# Patient Record
Sex: FEMALE | Race: WHITE | NOT HISPANIC OR LATINO | Employment: FULL TIME | URBAN - METROPOLITAN AREA
[De-identification: names, ages, dates, MRNs, and addresses within clinical notes are randomized per-mention and may not be internally consistent; named-entity substitution may affect disease eponyms.]

---

## 2021-05-21 ENCOUNTER — EXTERNAL ORDER RESULTS (OUTPATIENT)
Dept: TRANSPLANT | Facility: CLINIC | Age: 51
End: 2021-05-21

## 2021-05-21 ENCOUNTER — TRANSFERRED RECORDS (OUTPATIENT)
Dept: HEALTH INFORMATION MANAGEMENT | Facility: CLINIC | Age: 51
End: 2021-05-21

## 2021-05-26 ENCOUNTER — TRANSFERRED RECORDS (OUTPATIENT)
Dept: HEALTH INFORMATION MANAGEMENT | Facility: CLINIC | Age: 51
End: 2021-05-26

## 2021-06-02 ENCOUNTER — MEDICAL CORRESPONDENCE (OUTPATIENT)
Dept: HEALTH INFORMATION MANAGEMENT | Facility: CLINIC | Age: 51
End: 2021-06-02

## 2021-06-08 ENCOUNTER — TELEPHONE (OUTPATIENT)
Dept: ORTHOPEDICS | Facility: CLINIC | Age: 51
End: 2021-06-08

## 2021-06-08 NOTE — TELEPHONE ENCOUNTER
[facility] Call [facility]    Phone Message    May a detailed message be left on voicemail: yes     Reason for Call: Other: Patient is referred to Dr. Wilkinson, Dr. Hutchins or Dr. Rae for the Left Upper Arm Mass. Please contact Aliyah to schedule. Aliyah will also be sending records and any/all images will be pushed over.     Action Taken: Message routed to:  [facility] Orthopedics    Travel Screening: Not Applicable

## 2021-06-08 NOTE — TELEPHONE ENCOUNTER
Called Aliyah at [facility]. Offered first available apt with Dr. Rae on 6/15. They are ok with the date. They will call with questions or concerns.             -YISEL Webster- Orthopedics

## 2021-06-09 ENCOUNTER — TRANSCRIBE ORDERS (OUTPATIENT)
Dept: OTHER | Age: 51
End: 2021-06-09

## 2021-06-09 DIAGNOSIS — R22.32 ARM MASS, LEFT: Primary | ICD-10-CM

## 2021-06-09 NOTE — TELEPHONE ENCOUNTER
[facility]    Phone Message    May a detailed message be left on voicemail: yes     Reason for Call:  Aliyah is calling to relay that the request for Pathology slides was sent to [facility] in [place] since that is were they were taken -- the slides should be on there way but if we do need to reach out , the Case # to reference is [id number]     Action Taken: Message routed to:  [facility] ortho    Travel Screening: Not Applicable     No c/b needed - just passing info along

## 2021-06-11 PROCEDURE — 999N001032 HC STATISTIC REVIEW OUTSIDE SLIDES TC 88321: Performed by: ORTHOPAEDIC SURGERY

## 2021-06-11 PROCEDURE — 88321 CONSLTJ&REPRT SLD PREP ELSWR: CPT | Performed by: PATHOLOGY

## 2021-06-15 ENCOUNTER — VIRTUAL VISIT (OUTPATIENT)
Dept: ORTHOPEDICS | Facility: CLINIC | Age: 51
End: 2021-06-15
Payer: COMMERCIAL

## 2021-06-15 DIAGNOSIS — C49.9 SARCOMA OF SOFT TISSUE (H): Primary | ICD-10-CM

## 2021-06-15 DIAGNOSIS — C49.12 MALIGNANT NEOPLASM OF CONNECTIVE AND SOFT TISSUE OF LEFT UPPER LIMB, INCLUDING SHOULDER (H): ICD-10-CM

## 2021-06-15 PROBLEM — J45.909 REACTIVE AIRWAY DISEASE: Status: ACTIVE | Noted: 2021-06-15

## 2021-06-15 PROBLEM — N39.3 FEMALE STRESS INCONTINENCE: Status: ACTIVE | Noted: 2021-06-15

## 2021-06-15 PROBLEM — L68.0 HIRSUTISM: Status: ACTIVE | Noted: 2021-06-15

## 2021-06-15 PROBLEM — G47.33 OSA (OBSTRUCTIVE SLEEP APNEA): Status: ACTIVE | Noted: 2021-06-15

## 2021-06-15 PROBLEM — Z87.891 FORMER SMOKER: Status: ACTIVE | Noted: 2021-06-15

## 2021-06-15 PROBLEM — Z90.81 S/P SPLENECTOMY: Status: ACTIVE | Noted: 2021-06-15

## 2021-06-15 PROBLEM — G43.909 MIGRAINE: Status: ACTIVE | Noted: 2021-06-15

## 2021-06-15 PROBLEM — E11.9 DIABETES MELLITUS, TYPE II (H): Status: ACTIVE | Noted: 2021-06-15

## 2021-06-15 PROBLEM — G47.10 HYPERSOMNIA: Status: ACTIVE | Noted: 2021-06-15

## 2021-06-15 PROBLEM — E55.9 VITAMIN D DEFICIENCY: Status: ACTIVE | Noted: 2021-06-15

## 2021-06-15 PROBLEM — F90.9 ADHD: Status: ACTIVE | Noted: 2021-06-15

## 2021-06-15 PROCEDURE — 99202 OFFICE O/P NEW SF 15 MIN: CPT | Mod: 95 | Performed by: ORTHOPAEDIC SURGERY

## 2021-06-15 RX ORDER — RIZATRIPTAN BENZOATE 5 MG/1
TABLET ORAL
COMMUNITY
Start: 2021-05-21

## 2021-06-15 RX ORDER — CETIRIZINE HYDROCHLORIDE 10 MG/1
10 TABLET ORAL
COMMUNITY
Start: 2021-05-21

## 2021-06-15 RX ORDER — AMITRIPTYLINE HYDROCHLORIDE 50 MG/1
50 TABLET ORAL
COMMUNITY
Start: 2021-05-21

## 2021-06-15 RX ORDER — TOPIRAMATE 25 MG/1
25 TABLET, FILM COATED ORAL
COMMUNITY
Start: 2021-05-21

## 2021-06-15 NOTE — LETTER
6/15/2021         RE: Imani Albert  [address]  [facility] [address]        Dear Colleague,    Thank you for referring your patient, Imani Albert, to the [facility] [place]. Please see a copy of my visit note below.    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  We will arrange for her staging studies as well as her radiation oncology appointment and treatment.  I suspect based on what I know thus far she will require an additional surgical excision after she has been treated with her preoperative radiation.  Thank you for involving me in her care.  Please contact me if you have any questions.    Sincerely    Patient is a 50-year-old female who reports several years of the small stable mass in the posterior aspect of her left arm.  She reports the mass has enlarged over the past several months.  Seeing her local treatment team they recommended surgical excision as as the mass was bothering her.    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    To medicate her past medical history is reported to be none in her E HR.  However she is on a variety of medications which are also outlined and seem to be focused on treating migraine headaches.    She has had no previous imaging to review.    I discussed with her the nature of the sarcoma diagnosis and the staging that we'll need to occur.  I also described to her the need for radiation treatment and most likely additional surgery.  All her questions were answered.  She would like to move forward with evaluation and feels very strongly that she would like to perform through [facility] but in [place].    Impression: New diagnosis small subcutaneous high-grade pleomorphic liposarcoma.    Plan:  1. Chuyita to contact the patient to arrange a PET/CT and of the entire body and MRI scan of the left arm with a tumor bed marker.  Preferably arrange these at [place] if possible.  2.  Chuyita to arrange for the patient to be seen by one of the [place] sarcoma radiation physicians at [place].  3.  Chuyita to arrange for pathologist to review and confirm the diagnosis.        Abdirahman Rae MD

## 2021-06-15 NOTE — LETTER
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  We will arrange for her staging studies as well as her radiation oncology appointment and treatment.  I suspect based on what I know thus far she will require an additional surgical excision after she has been treated with her preoperative radiation.  Thank you for involving me in her care.  Please contact me if you have any questions.    Sincerely    Patient is a 50-year-old female who reports several years of the small stable mass in the posterior aspect of her left arm.  She reports the mass has enlarged over the past several months.  Seeing her local treatment team they recommended surgical excision as as the mass was bothering her.    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    To medicate her past medical history is reported to be none in her E HR.  However she is on a variety of medications which are also outlined and seem to be focused on treating migraine headaches.    She has had no previous imaging to review.    I discussed with her the nature of the sarcoma diagnosis and the staging that we'll need to occur.  I also described to her the need for radiation treatment and most likely additional surgery.  All her questions were answered.  She would like to move forward with evaluation and feels very strongly that she would like to perform through [facility] but in [place].    Impression: New diagnosis small subcutaneous high-grade pleomorphic liposarcoma.    Plan: 1. Chuyita to contact the patient to arrange a PET/CT and of the entire body and MRI scan of the left arm with a tumor bed marker.  Preferably arrange these at [place] if  possible.  2.  Chuyita to arrange for the patient to be seen by one of the [place] sarcoma radiation physicians at [place].  3.  Chuyita to arrange for pathologist to review and confirm the diagnosis.    Abdirahman Rae MD

## 2021-06-15 NOTE — PROGRESS NOTES
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  We will arrange for her staging studies as well as her radiation oncology appointment and treatment.  I suspect based on what I know thus far she will require an additional surgical excision after she has been treated with her preoperative radiation.  Thank you for involving me in her care.  Please contact me if you have any questions.    Sincerely    Patient is a 50-year-old female who reports several years of the small stable mass in the posterior aspect of her left arm.  She reports the mass has enlarged over the past several months.  Seeing her local treatment team they recommended surgical excision as as the mass was bothering her.    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    To medicate her past medical history is reported to be none in her E HR.  However she is on a variety of medications which are also outlined and seem to be focused on treating migraine headaches.    She has had no previous imaging to review.    I discussed with her the nature of the sarcoma diagnosis and the staging that we'll need to occur.  I also described to her the need for radiation treatment and most likely additional surgery.  All her questions were answered.  She would like to move forward with evaluation and feels very strongly that she would like to perform through [facility] but in [place].    Impression: New diagnosis small subcutaneous high-grade pleomorphic liposarcoma.    Plan: 1. Chuyita to contact the patient to arrange a PET/CT and of the entire body and MRI scan of the left arm with a tumor bed marker.  Preferably arrange these at [place] if possible.  2.  Chuyita to arrange for the patient to be seen by one of the [place]  sarcoma radiation physicians at [place].  3.  Chuyita to arrange for pathologist to review and confirm the diagnosis.

## 2021-06-15 NOTE — NURSING NOTE
Chief Complaint   Patient presents with     Consult     discuss left upper arm mass referred by Dr. Dutch Turcios at [facility] // she had a removal and was informed that it's cancerous per pt        50 year old  1970        Date/Surgery/Surgeon/Hospital:  1. 5/26/21 Left upper arm mass removal by Dr. Raman Turcios at [facility]                      Pain Assessment  Patient Currently in Pain: No(no pain per pt)                 [facility] [address] [place] [address] [address] [place] [address]        Allergies   Allergen Reactions     Justicia Adhatoda ([place] Nut Tree)  [Justicia Adhatoda]      Other reaction(s): Throat Swelling/Closing  All nuts that are packaged with shells     Rofecoxib      VIOXX              Other-Dizziness           Current Outpatient Medications   Medication     amitriptyline (ELAVIL) 50 MG tablet     cetirizine (ZYRTEC) 10 MG tablet     rizatriptan (MAXALT) 5 MG tablet     topiramate (TOPAMAX) 25 MG tablet     No current facility-administered medications for this visit.

## 2021-06-18 ENCOUNTER — TELEPHONE (OUTPATIENT)
Dept: ORTHOPEDICS | Facility: CLINIC | Age: 51
End: 2021-06-18

## 2021-06-18 NOTE — TELEPHONE ENCOUNTER
RN unable to schedule PET scan at [place] for PET scan as machine is either down or constructions.   not sure of reason.  Patient will do at the .

## 2021-06-18 NOTE — TELEPHONE ENCOUNTER
RN called and spoke with PET scan pre-clearance.  She will need prior authorization, may take 7-10 days,.  Leticia will submit as urgent.

## 2021-06-18 NOTE — TELEPHONE ENCOUNTER
RN called and spoke with Imani.  She is scheduled for her MRI of the Left arm with marker at [place] for  06-23-21 at 1130.  [facility]  [id number] [place] [address] Avenue       She is also scheduled for a PET scan.  Her insurance requires a prior authorization that may take as long as 7-10 days.  RN has scheduled for 06-28-21 at the [address] [address]    She was given restrictions and instructions.  She will attend both of these appoitments.  She will await for call from Radiation for consult.    RN has also requested slides from Guevara Mccullough for review here at the .

## 2021-06-21 LAB — COPATH REPORT: NORMAL

## 2021-06-23 ENCOUNTER — ANCILLARY PROCEDURE (OUTPATIENT)
Dept: MRI IMAGING | Facility: CLINIC | Age: 51
End: 2021-06-23
Attending: ORTHOPAEDIC SURGERY
Payer: COMMERCIAL

## 2021-06-23 DIAGNOSIS — C49.9 SARCOMA OF SOFT TISSUE (H): ICD-10-CM

## 2021-06-23 PROCEDURE — 73220 MRI UPPR EXTREMITY W/O&W/DYE: CPT | Mod: LT | Performed by: RADIOLOGY

## 2021-06-23 PROCEDURE — A9585 GADOBUTROL INJECTION: HCPCS | Performed by: RADIOLOGY

## 2021-06-23 RX ORDER — GADOBUTROL 604.72 MG/ML
10 INJECTION INTRAVENOUS ONCE
Status: COMPLETED | OUTPATIENT
Start: 2021-06-23 | End: 2021-06-23

## 2021-06-23 RX ADMIN — GADOBUTROL 10 ML: 604.72 INJECTION INTRAVENOUS at 12:16

## 2021-06-23 NOTE — PROGRESS NOTES
Radiation Oncology Consultation:  Date on this visit: 7/1/2021    Imani Albert  is referred by Dr.Denis See Rae for a radiation oncology consultation. She requires evaluation for diagnosis of subcutaneouds  high-grade pleomorphic liposarcoma      History Of Present Illness:  Ms. Alebrt is a 50 year old female who presents with a diagnosis of  high grade pleomorphic liposarcoma s/p incomplete excision on 5/26/21 by general surgeon Dr. Raman Turcios at [facility].    She reports several years of the small stable mass in the posterior aspect of her left arm which had enlarged over the past several months. There was no pain associated with this mass. Her local treatment physician recommended surgical excision as as the mass was bothering her. This was performed as an outpatient on 5/26/21 under local anesthesia.  An elliptical 6.3 x 2.5 cm skin incision was made after local anesthetic was injected, which was the posterior middle of the left upper arm.    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    She was referred to Dr Rae who recommended an MRI and PET for staging then preoperative RT followed by re-excision of the tumor bed (if no metastasis found).    She underwent the MRI 6/23 revealing subcutaneous edema and enhancement, presumably  related to combination of postsurgical change and residual tumors. This extends approximately 5 cm in craniocaudal dimension. This did not extend to investing layer of deep fascia. Additionally a small (6 x 6 mm) hyperintense enhancing focus within the proximal humeral shaft, nonspecific was seen.     PET/CT on 6/28/21 demonstrated multiple scattered pulmonary nodules, the largest measuring 5 mm, but no evidence of metastatic disease. The left arm  demonstrated partly imaged low-grade FDG uptake along the presumed surgical site.     She has thus been referred for pre-operative radiotherapy prior to re-excision by Dr. Rae. She has done well since surgery. Her range of motion is good. She does not have any pain. She has been actively trying to lose weight; she takes supplements Barrett Vy and walks 5 miles daily.       Past Medical/Surgical History:  Past Medical History:   Diagnosis Date     Depression      Migraines      Sarcoma (H) 05/26/2021    high grade pleomorphic sarcoma     Past Surgical History:   Procedure Laterality Date     SURGICAL PATHOLOGY EXAM Left 05/26/2021    Skin and Soft Tissue Excision of Left Upper Arm - [facility]       Past Radiation History: none  Past Chemotherapy History: none   Implanted Cardiac device:   none  History of Connective Tissue Disorders: no    Review of Systems:  Reviewed.  See details in nursing note    Allergies:  Allergies as of 07/01/2021 - Reviewed 06/15/2021   Allergen Reaction Noted     Markicia adhatoda ([place] nut tree)  [justicia adhatoda]  11/17/2009     Rofecoxib  06/15/2021       Current Medications:     Current Outpatient Medications:      amitriptyline (ELAVIL) 50 MG tablet, Take 50 mg by mouth, Disp: , Rfl:      cetirizine (ZYRTEC) 10 MG tablet, Take 10 mg by mouth, Disp: , Rfl:      rizatriptan (MAXALT) 5 MG tablet, TAKE ONE TABLET BY MOUTH EVERY 2 HOURS AS NEEDED FOR MIGRAINE. TAKE AT LEAST 2 HOURS APART. MAX 6 TABLETS PER DAY, Disp: , Rfl:      topiramate (TOPAMAX) 25 MG tablet, Take 25 mg by mouth, Disp: , Rfl:   No current facility-administered medications for this visit.     Family History:  No family history on file.    Social History:  Resides in [place] with significant other  Currently not working, off-season with [employer], works from home otherwise  Remote history of smoking  No alcohol use    Physical Exam:  There were no vitals taken for this visit.    GENERAL APPEARANCE: healthy, alert and  "no distress  HENT: Normocephalic, atraumatic   NECK: no adenopathy, no asymmetry or masses   LYMPHATICS: No cervical, supraclavicular, axillary or inguinal lymphadenopathy   RESP: breathing comfortably on room air, no audible wheezes  CARDIOVASCULAR: no cyanosis or pedal edema   ABDOMEN:  soft, nontender   MUSCULOSKELETAL: extremities normal- no gross deformities noted, no evidence of inflammation in joints, FROM in all extremities. No edema b/l LE.   Scar: well healing, along left upper arm.  SKIN: no suspicious lesions or rashes   PSYCHIATRIC: mentation appears normal and affect normal      Pathology: Reviewed    Laboratory/Imaging Studies:    PET and MRI were reviewed    ASSESSMENT:    T1a N0 M0  ( subcutaneous )   high grade liposarcoma of the left upper extremity.  s/p  excision with positive margins.      RECOMMENDATION:    I agree that a course of \"preoperative\"  radiotherapy followed by tumor bed re-exicsion would be the best course of action now. The dose is 50 Gy over 5 weeks, likely utilizing electrons given its relatively shallow location.     The alternative of defininitive postoperative radiotherapy is less desirable since the total dose would need to be on the order 66 Gy and the skin reation would be a challenge.      The risks and benefits of radiotherapy were discussed with the patient.  Potential acute and long term side effects were explained. The patient agrees to proceed with radiation therapy.  A written consent was obtained. We plan on starting radiotherapy on July 12.     Karla Diane MD PGY3  Department of Radiation Oncology  [phone]    Thank you for allowing me to participate in Imani Albert 's care .  Please do not hesitate to call me with questions.    Maite Long MD  [phone]   Pager   [phone]  [facility]   [phone] [place]  [phone] [facility]  Primary Physician: Raman Turcios   Patient Care Team:  Raman Turcios as PCP - " Chuyita Segal, RN as Specialty Care Coordinator (Orthopedics)  Raman Turcios as Referring Physician  SALOMÓN BECK

## 2021-06-28 ENCOUNTER — HOSPITAL ENCOUNTER (OUTPATIENT)
Dept: PET IMAGING | Facility: CLINIC | Age: 51
Discharge: HOME | End: 2021-06-28
Attending: ORTHOPAEDIC SURGERY | Admitting: ORTHOPAEDIC SURGERY
Payer: COMMERCIAL

## 2021-06-28 DIAGNOSIS — C49.9 SARCOMA OF SOFT TISSUE (H): ICD-10-CM

## 2021-06-28 DIAGNOSIS — C49.12 MALIGNANT NEOPLASM OF CONNECTIVE AND SOFT TISSUE OF LEFT UPPER LIMB, INCLUDING SHOULDER (H): ICD-10-CM

## 2021-06-28 PROCEDURE — 74177 CT ABD & PELVIS W/CONTRAST: CPT | Mod: 26

## 2021-06-28 PROCEDURE — 71260 CT THORAX DX C+: CPT

## 2021-06-28 PROCEDURE — 343N000001 HC RX 343: Performed by: ORTHOPAEDIC SURGERY

## 2021-06-28 PROCEDURE — 78816 PET IMAGE W/CT FULL BODY: CPT | Mod: PI

## 2021-06-28 PROCEDURE — 250N000011 HC RX IP 250 OP 636: Performed by: ORTHOPAEDIC SURGERY

## 2021-06-28 PROCEDURE — A9552 F18 FDG: HCPCS | Performed by: ORTHOPAEDIC SURGERY

## 2021-06-28 PROCEDURE — 71260 CT THORAX DX C+: CPT | Mod: 26

## 2021-06-28 PROCEDURE — 78816 PET IMAGE W/CT FULL BODY: CPT | Mod: 26

## 2021-06-28 RX ORDER — IOPAMIDOL 755 MG/ML
40-135 INJECTION, SOLUTION INTRAVASCULAR ONCE
Status: COMPLETED | OUTPATIENT
Start: 2021-06-28 | End: 2021-06-28

## 2021-06-28 RX ADMIN — IOPAMIDOL 128 ML: 755 INJECTION, SOLUTION INTRAVENOUS at 15:18

## 2021-06-28 RX ADMIN — FLUDEOXYGLUCOSE F-18 12.59 MCI.: 500 INJECTION, SOLUTION INTRAVENOUS at 14:17

## 2021-06-29 ENCOUNTER — TELEPHONE (OUTPATIENT)
Dept: ORTHOPEDICS | Facility: CLINIC | Age: 51
End: 2021-06-29

## 2021-06-29 NOTE — TELEPHONE ENCOUNTER
[facility] Call [facility]    Phone Message    May a detailed message be left on voicemail: yes     Reason for Call: Other: Patient recently had an MRI and PET scan done and wanted to know those results.     Action Taken: Message routed to:  [facility] ortho    Travel Screening: Not Applicable

## 2021-06-30 ENCOUNTER — TELEPHONE (OUTPATIENT)
Dept: ORTHOPEDICS | Facility: CLINIC | Age: 51
End: 2021-06-30

## 2021-06-30 NOTE — PROGRESS NOTES
"   Department of Radiation Oncology  [address] [address]  [phone]       Consultation Note    Name: Imani Albert MRN: [id number]   : 1970   Date of Service: 2021 Referring: Dr. Rae (Orthopedic Surgery)     Reason for consultation: Pleomorphic liposarcoma of the left arm    History of Present Illness   Ms. Albert is a 50 year old ***    Past Medical History:    ***  Past Medical History:   Diagnosis Date     Depression      Migraines      Sarcoma (H) 2021    high grade pleomorphic sarcoma       Past Surgical History:    ***  Past Surgical History:   Procedure Laterality Date     SURGICAL PATHOLOGY EXAM Left 2021    Skin and Soft Tissue Excision of Left Upper Arm - [facility]       Chemotherapy History:  ***    Radiation History:  ***    Pregnant: {Yes/No/NA:498015::\"No\"}  Implanted Cardiac Devices: {Yes/No:415830}    Medications:    ***  Current Outpatient Medications   Medication     amitriptyline (ELAVIL) 50 MG tablet     cetirizine (ZYRTEC) 10 MG tablet     rizatriptan (MAXALT) 5 MG tablet     topiramate (TOPAMAX) 25 MG tablet     No current facility-administered medications for this visit.        Allergies:    ***     Allergies   Allergen Reactions     Justicia Adhatoda ([place] Nut Tree)  [Justicia Adhatoda]      Other reaction(s): Throat Swelling/Closing  All nuts that are packaged with shells     Rofecoxib      VIOXX              Other-Dizziness       Social History:  Tobacco: ***  Alcohol: ***  Employment: ***  ***    Family History:    ***  No family history on file.    Review of Systems   A 10-point review of systems was performed. Pertinent findings are noted in the HPI.    Physical Exam   ECOG Status: {0-4:997743621}    Vitals:  B/P: ***  T: ***  P: ***  Weight: ***  Pain: {0-10:561665}/10    Gen: Alert, in NAD  Head: NC/AT  Eyes: PERRL, EOMI, sclera anicteric  Ears: No external auricular lesions  Nose/sinus: No " rhinorrhea or epistaxis  Oral cavity/oropharynx: MMM, no visible oral cavity lesions, FOM and BOT are soft to palpation  Neck: Full ROM, supple, no palpable adenopathy  Pulm: No wheezing, stridor or respiratory distress  CV: Extremities are warm and well-perfused, no cyanosis, no pedal edema  Abdominal: Normal bowel sounds, soft, nontender, no masses  Musculoskeletal: Normal bulk and tone  Skin: Normal color and turgor  Neuro: A/Ox3, CN II-XII intact, normal gait    Imaging/Path/Labs   Imaging: ***    Path: ***    Labs: ***    Assessment    Ms. Albert is a 50 year old female with ***    Plan   ***    Karla Diane MD PGY3  Department of Radiation Oncology  [phone]

## 2021-06-30 NOTE — TELEPHONE ENCOUNTER
[facility] Call [facility]    Phone Message    May a detailed message be left on voicemail: yes     Reason for Call: Other: Patient calling a 2nd time regarding results      Action Taken: Message routed to:  [facility] Ortho    Travel Screening: Not Applicable

## 2021-06-30 NOTE — TELEPHONE ENCOUNTER
RN returned call to Imani.  Dr. Rae has reviewed the PET scan results.  Negative for tumor.  She is also asking for results of the MRI that she did as well.  RN will check with MD tomorrow and call her back.        Abdirahman Rae MD Chapman-Shultz, Dixie, ARTURO             Please call the patient with the results. Scan negative for tumor       Abdirahman Rae MD        PET Oncology Whole Body  Order: [id number]  Status:  Final result   Visible to patient:  No (inaccessible in MyChart) Dx:  Sarcoma of soft tissue (H); Malignant...  Details    Reading Physician Reading Date Result Priority   Sung Don MD  [phone] 6/29/2021    Odalys Diaz MD  [phone] 6/29/2021       Narrative & Impression     Combined Report of:    PET and CT on  6/28/2021 3:46 PM :     1. PET of the neck, chest, abdomen, and pelvis.  2. PET CT Fusion for Attenuation Correction and Anatomical  Localization:    3. Diagnostic CT scan of the chest, abdomen, and pelvis with  intravenous contrast for interpretation.  3. CT of the chest, abdomen and pelvis obtained for diagnostic  interpretation.  4. 3D MIP and PET-CT fused images were processed on an independent  workstation and archived to PACS and reviewed by a radiologist.     Technique:     1. PET: The patient received 12.59 mCi of F-18-FDG; the serum glucose  was 109 prior to administration, body weight was 95.5 kg. Images were  evaluated in the axial, sagittal, and coronal planes as well as the  rotational whole body MIP. Images were acquired from the Vertex to the  Feet.     UPTAKE WAS MEASURED AT 60 MINUTES.      BACKGROUND:  Liver SUV max= 4.9,   Aorta Blood SUV Max: 4.0.      2. CT: Volumetric acquisition for clinical interpretation of the  chest, abdomen, and pelvis acquired at 3 mm sections . The chest,  abdomen, and pelvis were evaluated at 5 mm sections in bone, soft  tissue, and lung windows.       The patient received 128 cc of Isovue 370  intravenously for the  examination.    --     3. 3D MIP and PET-CT fused images were processed on an independent  workstation and archived to PACS and reviewed by a radiologist.     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     COMPARISON: MR 6/23/2021     FINDINGS:      HEAD/NECK:  There is no  suspicious FDG uptake in the neck.      The paranasal sinuses are clear. The mastoid air cells are clear.      The mucosal pharyngeal space, the , prevertebral and carotid  spaces are within normal limits.      No masses, mass effect or pathologically enlarged lymph nodes are  evident. The thyroid gland is within normal limits.     CHEST:  There is no suspicious FDG uptake in the chest.      The central tracheobronchial tree is clear. No pleural effusion or  pneumothorax. Calcified left upper lobe pulmonary granuloma.  Subcentimeter satellite solid pulmonary nodules adjacent to the  calcified granuloma, the largest measuring 5 mm (series 9 image 35).  Scattered bilateral pulmonary nodules as representative examples  including, 5 mm groundglass pulmonary nodule in the peripheral left  lower lobe (series 9 image 55) and 4 mm solid pulmonary nodule in the  lingula (series 9 image 63). No focal pulmonary consolidation  suspicious for infection. Mild bibasilar atelectasis.     Conventional branching pattern of the great vessels. The ascending  aorta and main pulmonary artery are normal in caliber. The heart is  not enlarged. No pericardial effusion. No enlarged or hypermetabolic  thoracic lymph nodes. Prominent right axillary lymph node measuring 12  mm with SUV max 3.5, likely reactive.     ABDOMEN AND PELVIS:  There is no suspicious FDG uptake in the abdomen or pelvis.  Enlarged  left inguinal lymph node measuring 1.1 x 1.1 cm with SUV max 2.5.     No suspicious hepatic mass. Focal fat deposition along the falciform  ligament. No intra or extrahepatic biliary ductal dilation.  Postsurgical changes of cholecystectomy and splenectomy. The adrenal  glands and pancreas are within normal limits. The kidneys demonstrate  symmetric cortical enhancement bilaterally. No suspicious renal mass.  Bilateral cystic hypoattenuating lesions are favored to represent  renal cysts. Prominence of the extrarenal right pelvis with normal  caliber ureter distal to the ureteropelvic junction. The urinary  bladder is within normal limits. Fibroid uterus. No suspicious adnexal  mass. Dominant left ovarian follicle measuring 1.8 cm. The small and  large bowel are normal in caliber. No bowel wall thickening or fat  stranding. Trace free fluid in the pelvis, physiologic. No free air.  The abdominal aorta and its visceral branches are patent and normal in  caliber.     EXTREMITIES:   The surgical changes of the left arm are incompletely imaged. There is  partly imaged low-grade FDG uptake along the presumed surgical site.  No abnormal masses or hypermetabolic lesions.     BONES:   There are no suspicious lytic or blastic osseous lesions.  There is no  abnormal FDG uptake in the skeleton. 6 mm sclerotic focus in the T6  vertebral body without hypermetabolism, consistent with benign  enostosis.                                                                      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 No FDG avid primary metastatic tumor is identified.  2.  Prominent left inguinal and right axillary lymph nodes without  significant FDG avidity, favored to be reactive.  3. Subcentimeter pulmonary nodules as described above, recommend  continued attention on follow-up exams.     I have personally reviewed the examination and  initial interpretation  and I agree with the findings.     KEN REMY MD

## 2021-07-01 ENCOUNTER — APPOINTMENT (OUTPATIENT)
Dept: RADIATION ONCOLOGY | Facility: CLINIC | Age: 51
End: 2021-07-01
Payer: COMMERCIAL

## 2021-07-01 ENCOUNTER — OFFICE VISIT (OUTPATIENT)
Dept: RADIATION ONCOLOGY | Facility: CLINIC | Age: 51
End: 2021-07-01
Attending: ORTHOPAEDIC SURGERY
Payer: COMMERCIAL

## 2021-07-01 VITALS
TEMPERATURE: 98 F | OXYGEN SATURATION: 97 % | RESPIRATION RATE: 18 BRPM | HEART RATE: 71 BPM | WEIGHT: 214.4 LBS | DIASTOLIC BLOOD PRESSURE: 94 MMHG | SYSTOLIC BLOOD PRESSURE: 144 MMHG

## 2021-07-01 DIAGNOSIS — C49.9 SARCOMA OF SOFT TISSUE (H): Primary | ICD-10-CM

## 2021-07-01 LAB — HCG UR QL: NEGATIVE

## 2021-07-01 PROCEDURE — 77290 THER RAD SIMULAJ FIELD CPLX: CPT | Performed by: RADIOLOGY

## 2021-07-01 PROCEDURE — 81025 URINE PREGNANCY TEST: CPT | Performed by: RADIOLOGY

## 2021-07-01 PROCEDURE — 77263 THER RADIOLOGY TX PLNG CPLX: CPT | Performed by: RADIOLOGY

## 2021-07-01 PROCEDURE — 99204 OFFICE O/P NEW MOD 45 MIN: CPT | Mod: GC | Performed by: RADIOLOGY

## 2021-07-01 PROCEDURE — 77334 RADIATION TREATMENT AID(S): CPT | Performed by: RADIOLOGY

## 2021-07-01 SDOH — HEALTH STABILITY: MENTAL HEALTH: HOW OFTEN DO YOU HAVE 6 OR MORE DRINKS ON ONE OCCASION?: NOT ASKED

## 2021-07-01 SDOH — HEALTH STABILITY: MENTAL HEALTH: HOW MANY STANDARD DRINKS CONTAINING ALCOHOL DO YOU HAVE ON A TYPICAL DAY?: NOT ASKED

## 2021-07-01 SDOH — HEALTH STABILITY: MENTAL HEALTH: HOW OFTEN DO YOU HAVE A DRINK CONTAINING ALCOHOL?: NOT ASKED

## 2021-07-01 ASSESSMENT — PAIN SCALES - GENERAL: PAINLEVEL: NO PAIN (0)

## 2021-07-01 NOTE — LETTER
7/1/2021         RE: Imani Albert  [address]  [place] [address]        Dear Colleague,    Thank you for referring your patient, Imani Albert, to the [facility] RADIATION [facility]COLOG[facility] [place]. Please see a copy of my visit note below.    Radiation Oncology Consultation:  Date on this visit: 7/1/2021    Imani Albert  is referred by Dr.Denis See Rae for a radiation oncology consultation. She requires evaluation for diagnosis of subcutaneouds  high-grade pleomorphic liposarcoma      History Of Present Illness:  Ms. Albert is a 50 year old female who presents with a diagnosis of  high grade pleomorphic liposarcoma s/p incomplete excision on 5/26/21 by general surgeon Dr. Raman Turcios at [facility].    She reports several years of the small stable mass in the posterior aspect of her left arm which had enlarged over the past several months. There was no pain associated with this mass. Her local treatment physician recommended surgical excision as as the mass was bothering her. This was performed as an outpatient on 5/26/21 under local anesthesia.  An elliptical 6.3 x 2.5 cm skin incision was made after local anesthetic was injected, which was the posterior middle of the left upper arm.    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    She was referred to Dr Rae who recommended an MRI and PET for staging then preoperative RT followed by re-excision of the tumor bed (if no metastasis found).    She underwent the MRI 6/23 revealing subcutaneous edema and enhancement, presumably  related to combination of postsurgical change and residual tumors. This extends approximately 5 cm in craniocaudal dimension. This did not extend to investing layer of deep fascia.  Additionally a small (6 x 6 mm) hyperintense enhancing focus within the proximal humeral shaft, nonspecific was seen.     PET/CT on 6/28/21 demonstrated multiple scattered pulmonary nodules, the largest measuring 5 mm, but no evidence of metastatic disease. The left arm demonstrated partly imaged low-grade FDG uptake along the presumed surgical site.     She has thus been referred for pre-operative radiotherapy prior to re-excision by Dr. Rae. She has done well since surgery. Her range of motion is good. She does not have any pain. She has been actively trying to lose weight; she takes supplements Barrett Vy and walks 5 miles daily.       Past Medical/Surgical History:  Past Medical History:   Diagnosis Date     Depression      Migraines      Sarcoma (H) 05/26/2021    high grade pleomorphic sarcoma     Past Surgical History:   Procedure Laterality Date     SURGICAL PATHOLOGY EXAM Left 05/26/2021    Skin and Soft Tissue Excision of Left Upper Arm - [facility]       Past Radiation History: none  Past Chemotherapy History: none   Implanted Cardiac device:   none  History of Connective Tissue Disorders: no    Review of Systems:  Reviewed.  See details in nursing note    Allergies:  Allergies as of 07/01/2021 - Reviewed 06/15/2021   Allergen Reaction Noted     Justicia adhatoda (malabar nut tree)  [justicia adhatoda]  11/17/2009     Rofecoxib  06/15/2021       Current Medications:     Current Outpatient Medications:      amitriptyline (ELAVIL) 50 MG tablet, Take 50 mg by mouth, Disp: , Rfl:      cetirizine (ZYRTEC) 10 MG tablet, Take 10 mg by mouth, Disp: , Rfl:      rizatriptan (MAXALT) 5 MG tablet, TAKE ONE TABLET BY MOUTH EVERY 2 HOURS AS NEEDED FOR MIGRAINE. TAKE AT LEAST 2 HOURS APART. MAX 6 TABLETS PER DAY, Disp: , Rfl:      topiramate (TOPAMAX) 25 MG tablet, Take 25 mg by mouth, Disp: , Rfl:   No current facility-administered medications for this visit.     Family History:  No family history on file.    Social  "History:  Resides in [place] with significant other  Currently not working, off-season with [employer], works from home otherwise  Remote history of smoking  No alcohol use    Physical Exam:  There were no vitals taken for this visit.    GENERAL APPEARANCE: healthy, alert and no distress  HENT: Normocephalic, atraumatic   NECK: no adenopathy, no asymmetry or masses   LYMPHATICS: No cervical, supraclavicular, axillary or inguinal lymphadenopathy   RESP: breathing comfortably on room air, no audible wheezes  CARDIOVASCULAR: no cyanosis or pedal edema   ABDOMEN:  soft, nontender   MUSCULOSKELETAL: extremities normal- no gross deformities noted, no evidence of inflammation in joints, FROM in all extremities. No edema b/l LE.   Scar: well healing, along left upper arm.  SKIN: no suspicious lesions or rashes   PSYCHIATRIC: mentation appears normal and affect normal      Pathology: Reviewed    Laboratory/Imaging Studies:    PET and MRI were reviewed    ASSESSMENT:    T1a N0 M0  ( subcutaneous )   high grade liposarcoma of the left upper extremity.  s/p  excision with positive margins.      RECOMMENDATION:    I agree that a course of \"preoperative\"  radiotherapy followed by tumor bed re-exicsion would be the best course of action now. The dose is 50 Gy over 5 weeks, likely utilizing electrons given its relatively shallow location.     The alternative of defininitive postoperative radiotherapy is less desirable since the total dose would need to be on the order 66 Gy and the skin reation would be a challenge.      The risks and benefits of radiotherapy were discussed with the patient.  Potential acute and long term side effects were explained. The patient agrees to proceed with radiation therapy.  A written consent was obtained. We plan on starting radiotherapy on July 12.     Karla Diane MD PGY3  Department of Radiation Oncology  [phone]    Thank you for allowing me to participate in Imani Albert 's care .  " Please do not hesitate to call me with questions.    Maite Long MD  [phone]   Pager   [phone]  [facility]   [phone] [place]  [phone] [facility]  Primary Physician: Raman Turcios   Patient Care Team:  Raman Turcios as PCP - General  Chuyita Davis RN as Specialty Care Coordinator (Orthopedics)  Raman Turcios as Referring Physician  SALOMÓN BECK                  Again, thank you for allowing me to participate in the care of your patient.        Sincerely,        Maite Long MD

## 2021-07-01 NOTE — NURSING NOTE
"INITIAL PATIENT ASSESSMENT      Diagnosis: sarcoma    Prior radiation therapy: None    Prior chemotherapy: None    Prior hormonal therapy:No    Pain Eval:  Denies    Psychosocial  Living arrangements: lives at home in [place]  Fall Risk: independent  [facility] Risk Screening Completed: Yes Result: Negative   referral needs: Not needed    Advanced Directive: No  Implantable Cardiac Device: No  Authorization To Share Protected Health Information: YES - Date: 7/1/2021      LMP: 6/21/2021  Onset of menopause: pre-menopausal  Abnormal vaginal bleeding/discharge: No  Are you pregnant? No  Urine Pregnancy Testing Needed: Yes, urine specimen collected at time of CT Simulation.    Review of Systems     Constitutional: Negative.    HENT: Negative.    Eyes: Negative.    Respiratory: Negative.    Cardiovascular: Positive for leg swelling. Negative for chest pain, palpitations, orthopnea, claudication and PND.        Patient reports intermittent bilateral lower extremity edema, relief with rest and elevation.   Gastrointestinal: Negative.    Genitourinary: Negative.    Musculoskeletal: Negative.    Skin: Negative.    Neurological: Positive for headaches. Negative for dizziness, tingling, tremors, sensory change, speech change, focal weakness, seizures, loss of consciousness and weakness.        Patient reports intermittent headaches, reports migraines at baseline.   Endo/Heme/Allergies: Negative.    Psychiatric/Behavioral: Negative for depression, hallucinations, memory loss, substance abuse and suicidal ideas. The patient is nervous/anxious. The patient does not have insomnia.         Patient reports strong support system, reports nervousness \"this is all new to me\".     Nurse face-to-face time: Level 5:  over 15 min face to face time.    Teodora Camilo RN BSN OCN CBCN      "

## 2021-07-01 NOTE — PATIENT INSTRUCTIONS
Preventive Care:    Breast Cancer Screening: During our visit today, we discussed that it is recommended you receive breast cancer screening. Please call or make an appointment with your primary care provider to discuss this with them. You may also call the [facility] scheduling line ([phone]) to set up a mammography appointment at the Breast Center within the [facility].        What to expect at your Simulation visit:    You will meet with a Radiation Therapist and other team members who will be doing a planning session called a  simulation  with you. This process will determine your daily treatment.    ~ You will lie on a flat table and have a treatment planning CT scan.  It is important during the scan to hold very still and breathe normally.    ~ Your therapist may construct a body mold to help you hold still for your treatments.    ~ If you are having treatment to the head or neck area you will be fitted with a plastic mesh mask that fits very snugly over your face and neck.     ~ Your therapist will be taking some digital photos that will go in your treatment chart.      ~Your therapist will make marks on your skin and take measurements. Your therapist may ask you about making small tattoos (a permanent small dot) over these marks.  These marks are used to position you daily for your radiation therapy treatments. Please do not wash off any marks until all of your radiation therapy treatments are complete unless you are instructed to do so by your therapist.    ~ Once the simulation is completed it can take from 3 to 10 business days before you start radiation therapy treatments.    ~ You may meet with a nurse who will go over management of treatment side effects and self care during your treatments. The nurse will help to plan care with other departments and physicians if needed.      Please contact [facility] RN with questions or concerns following today's  appointment: [phone].    Thank you!

## 2021-07-02 ENCOUNTER — TELEPHONE (OUTPATIENT)
Dept: ORTHOPEDICS | Facility: CLINIC | Age: 51
End: 2021-07-02

## 2021-07-02 DIAGNOSIS — C49.9 SARCOMA OF SOFT TISSUE (H): Primary | ICD-10-CM

## 2021-07-02 NOTE — TELEPHONE ENCOUNTER
RN called and left a voice message for Imani. See below.      Abdirahman Rae MD Chapman-Shultz, Dixie, RN             Please let e know MRI shows no tumor in the arm.   Thanks    Previous Messages    ----- Message -----   From: Chuyita Davis RN   Sent: 6/30/2021   4:52 PM CDT   To: Abdirahman Rae MD     She would also like to know the results of the MRI that was done as well.  RN called with PET already.   Thanks   Chuyita

## 2021-07-04 ENCOUNTER — HEALTH MAINTENANCE LETTER (OUTPATIENT)
Age: 51
End: 2021-07-04

## 2021-07-06 ENCOUNTER — APPOINTMENT (OUTPATIENT)
Dept: RADIATION ONCOLOGY | Facility: CLINIC | Age: 51
End: 2021-07-06
Payer: COMMERCIAL

## 2021-07-06 PROCEDURE — 77321 SPECIAL TELETX PORT PLAN: CPT | Performed by: RADIOLOGY

## 2021-07-06 PROCEDURE — 77334 RADIATION TREATMENT AID(S): CPT | Performed by: RADIOLOGY

## 2021-07-08 DIAGNOSIS — C49.9 SARCOMA OF SOFT TISSUE (H): ICD-10-CM

## 2021-07-08 LAB
LABORATORY COMMENT REPORT: NORMAL
SARS-COV-2 RNA RESP QL NAA+PROBE: NEGATIVE
SARS-COV-2 RNA RESP QL NAA+PROBE: NORMAL
SPECIMEN SOURCE: NORMAL
SPECIMEN SOURCE: NORMAL

## 2021-07-08 PROCEDURE — U0003 INFECTIOUS AGENT DETECTION BY NUCLEIC ACID (DNA OR RNA); SEVERE ACUTE RESPIRATORY SYNDROME CORONAVIRUS 2 (SARS-COV-2) (CORONAVIRUS DISEASE [COVID-19]), AMPLIFIED PROBE TECHNIQUE, MAKING USE OF HIGH THROUGHPUT TECHNOLOGIES AS DESCRIBED BY CMS-2020-01-R: HCPCS | Performed by: RADIOLOGY

## 2021-07-08 PROCEDURE — U0005 INFEC AGEN DETEC AMPLI PROBE: HCPCS | Performed by: RADIOLOGY

## 2021-07-12 ENCOUNTER — APPOINTMENT (OUTPATIENT)
Dept: RADIATION ONCOLOGY | Facility: CLINIC | Age: 51
End: 2021-07-12
Payer: COMMERCIAL

## 2021-07-12 PROCEDURE — 77412 RADIATION TX DELIVERY LVL 3: CPT | Performed by: RADIOLOGY

## 2021-07-12 PROCEDURE — 77280 THER RAD SIMULAJ FIELD SMPL: CPT | Performed by: RADIOLOGY

## 2021-07-13 ENCOUNTER — APPOINTMENT (OUTPATIENT)
Dept: RADIATION ONCOLOGY | Facility: CLINIC | Age: 51
End: 2021-07-13
Payer: COMMERCIAL

## 2021-07-13 PROCEDURE — 77332 RADIATION TREATMENT AID(S): CPT | Performed by: RADIOLOGY

## 2021-07-13 PROCEDURE — 77412 RADIATION TX DELIVERY LVL 3: CPT | Performed by: RADIOLOGY

## 2021-07-14 ENCOUNTER — APPOINTMENT (OUTPATIENT)
Dept: RADIATION ONCOLOGY | Facility: CLINIC | Age: 51
End: 2021-07-14
Payer: COMMERCIAL

## 2021-07-14 PROCEDURE — 77412 RADIATION TX DELIVERY LVL 3: CPT | Performed by: RADIOLOGY

## 2021-07-15 ENCOUNTER — OFFICE VISIT (OUTPATIENT)
Dept: RADIATION ONCOLOGY | Facility: CLINIC | Age: 51
End: 2021-07-15
Payer: COMMERCIAL

## 2021-07-15 ENCOUNTER — APPOINTMENT (OUTPATIENT)
Dept: RADIATION ONCOLOGY | Facility: CLINIC | Age: 51
End: 2021-07-15
Payer: COMMERCIAL

## 2021-07-15 VITALS
WEIGHT: 216 LBS | DIASTOLIC BLOOD PRESSURE: 91 MMHG | RESPIRATION RATE: 20 BRPM | SYSTOLIC BLOOD PRESSURE: 142 MMHG | OXYGEN SATURATION: 96 % | TEMPERATURE: 97.9 F | HEART RATE: 68 BPM

## 2021-07-15 DIAGNOSIS — C49.9 SARCOMA OF SOFT TISSUE (H): Primary | ICD-10-CM

## 2021-07-15 PROCEDURE — 99207 PR DROP WITH A PROCEDURE: CPT | Performed by: RADIOLOGY

## 2021-07-15 PROCEDURE — 77412 RADIATION TX DELIVERY LVL 3: CPT | Performed by: RADIOLOGY

## 2021-07-15 ASSESSMENT — PAIN SCALES - GENERAL: PAINLEVEL: NO PAIN (0)

## 2021-07-15 NOTE — PROGRESS NOTES
[facility] PHYSICIANS  SPECIALIZING IN BREAKTHROUGHS  Radiation Oncology    On Treatment Visit Note      Imani Albert      Date: Jul 15, 2021   MRN: [id number]   : 1970  Diagnosis: Sarcoma      Reason for Visit:  On Radiation Treatment Visit     Treatment Summary to Date  Treatment Site: Left posterior arm Current Dose: 800/5000 cGy Fractions:       Chemotherapy  Chemo concurrent with radx?: No    Subjective:  Ms. Albert is seen for her weekly on-treatment visit. She is tolerating treatment well, other than having to get up earlier than she is normally used to. She does state she is having fatigue, and is taking naps during the day. She notes no skin irritation or redness to the area.     Nursing ROS:   Nutrition Alteration  Diet Type: Patient's Preference  Skin  Skin Reaction: 0 - No changes  Skin Intervention: skin changes and skin cares reviewed       Cardiovascular  Respiratory effort: 1 - Normal - without distress        Psychosocial  Mood - Anxiety: 0 - Normal  Mood - Depression: 0 - Normal  Pyschosocial Note: slight fatigue, patient reports she is not used to early mornings  Pain Assessment  0-10 Pain Scale: 0      Objective:   BP (!) 142/91 (BP Location: Right arm, Patient Position: Chair, Cuff Size: Adult Large)   Pulse 68   Temp 97.9  F (36.6  C) (Oral)   Resp 20   Wt 98 kg (216 lb)   LMP 2021   SpO2 96%   Gen: Appears well, in no acute distress  Skin: No erythema    Labs:  CBC RESULTS: No results for input(s): WBC, RBC, HGB, HCT, MCV, MCH, MCHC, RDW, PLT in the last 92340 hours.  ELECTROLYTES:  No results for input(s): NA, POTASSIUM, CHLORIDE, TELLO, CO2, BUN, CR, GLC in the last 90785 hours.    Assessment:    Tolerating radiation therapy well.  All questions and concerns addressed.    Toxicities:  Fatigue: Grade 0: No toxicity  Dermatitis: Grade 0: No toxicity     Plan:   1. Continue current therapy.    2. Skin care discussed.  3. Patient does take multi-vitamins with  multiple anti-oxidants. We discussed that she may continue to take these later in the afternoon, several hours after she has completed her radiation for the day. The patient was amenable to this.       Mosaiq chart and setup information reviewed  Ports checked    Medication Review  Med list reviewed with patient?: Yes  Med list printed and given: Offered and declined    Educational Topic Discussed  Education Instructions: radiation therapy side effects: fatigue, skin changes and skin cares    The patient was seen and assessed with staff, Dr. Long, who agrees with the above assessment and plan.      Karla Diane MD PGY3  Department of Radiation Oncology  [phone]    I saw the patient with the resident.  I agree with the resident note and plan of care.      Maite Long MD

## 2021-07-15 NOTE — NURSING NOTE
Teaching Flowsheet   Relevant Diagnosis: sarcoma  Teaching Topic: radiation therapy     Person(s) involved in teaching:   Patient     Motivation Level:  Asks Questions: Yes  Eager to Learn: Yes  Cooperative: Yes  Receptive (willing/able to accept information): Yes  Any cultural factors/Confucianist beliefs that may influence understanding or compliance? No       Patient demonstrates understanding of the following:  Reason for the appointment, diagnosis and treatment plan: Yes  Knowledge of proper use of medications and conditions for which they are ordered (with special attention to potential side effects or drug interactions): Yes  Which situations necessitate calling provider and whom to contact: Yes       Teaching Concerns Addressed:   Comments: radiation therapy side effects: fatigue, skin changes and skin cares     Proper use and care of  (medical equip, care aids, etc.): NA  Nutritional needs and diet plan: Yes  Pain management techniques: Yes  Wound Care: Yes  How and/when to access community resources: Yes     Instructional Materials Used/Given: Radiation therapy educational handouts: fatigue, skin changes and skin cares     Time spent with patient: 15 minutes.    Teodora Camilo RN BSN OCN CBCN

## 2021-07-15 NOTE — LETTER
7/15/2021         RE: Imani Albert  [address]  [address]  [place] [address]        Dear Colleague,    Thank you for referring your patient, Imani Albert, to the [facility] RADIATION ONCOL[facility] [place]. Please see a copy of my visit note below.    [facility] PHYSICIANS  SPECIALIZING IN BREAKTHROUGHS  Radiation Oncology    On Treatment Visit Note      Imani Albert      Date: Jul 15, 2021   MRN: [id number]   : 1970  Diagnosis: Sarcoma      Reason for Visit:  On Radiation Treatment Visit     Treatment Summary to Date  Treatment Site: Left posterior arm Current Dose: 800/5000 cGy Fractions:       Chemotherapy  Chemo concurrent with radx?: No    Subjective:  Ms. Albert is seen for her weekly on-treatment visit. She is tolerating treatment well, other than having to get up earlier than she is normally used to. She does state she is having fatigue, and is taking naps during the day. She notes no skin irritation or redness to the area.     Nursing ROS:   Nutrition Alteration  Diet Type: Patient's Preference  Skin  Skin Reaction: 0 - No changes  Skin Intervention: skin changes and skin cares reviewed       Cardiovascular  Respiratory effort: 1 - Normal - without distress        Psychosocial  Mood - Anxiety: 0 - Normal  Mood - Depression: 0 - Normal  Pyschosocial Note: slight fatigue, patient reports she is not used to early mornings  Pain Assessment  0-10 Pain Scale: 0      Objective:   BP (!) 142/91 (BP Location: Right arm, Patient Position: Chair, Cuff Size: Adult Large)   Pulse 68   Temp 97.9  F (36.6  C) (Oral)   Resp 20   Wt 98 kg (216 lb)   LMP 2021   SpO2 96%   Gen: Appears well, in no acute distress  Skin: No erythema    Labs:  CBC RESULTS: No results for input(s): WBC, RBC, HGB, HCT, MCV, MCH, MCHC, RDW, PLT in the last 69892 hours.  ELECTROLYTES:  No results for input(s): NA, POTASSIUM, CHLORIDE, TELLO, CO2, BUN, CR, GLC in the last 70566  hours.    Assessment:    Tolerating radiation therapy well.  All questions and concerns addressed.    Toxicities:  Fatigue: Grade 0: No toxicity  Dermatitis: Grade 0: No toxicity     Plan:   1. Continue current therapy.    2. Skin care discussed.  3. Patient does take multi-vitamins with multiple anti-oxidants. We discussed that she may continue to take these later in the afternoon, several hours after she has completed her radiation for the day. The patient was amenable to this.       Mosaiq chart and setup information reviewed  Ports checked    Medication Review  Med list reviewed with patient?: Yes  Med list printed and given: Offered and declined    Educational Topic Discussed  Education Instructions: radiation therapy side effects: fatigue, skin changes and skin cares    The patient was seen and assessed with staff, Dr. Long, who agrees with the above assessment and plan.      Karla Diane MD PGY3  Department of Radiation Oncology  [phone]    I saw the patient with the resident.  I agree with the resident note and plan of care.      Maite Long MD          Again, thank you for allowing me to participate in the care of your patient.        Sincerely,        Maite Long MD

## 2021-07-15 NOTE — PATIENT INSTRUCTIONS
Please contact [facility] RN with questions or concerns following today's appointment: [phone].    Thank you!

## 2021-07-16 ENCOUNTER — APPOINTMENT (OUTPATIENT)
Dept: RADIATION ONCOLOGY | Facility: CLINIC | Age: 51
End: 2021-07-16
Payer: COMMERCIAL

## 2021-07-16 PROCEDURE — 77336 RADIATION PHYSICS CONSULT: CPT | Performed by: RADIOLOGY

## 2021-07-16 PROCEDURE — 77412 RADIATION TX DELIVERY LVL 3: CPT | Performed by: RADIOLOGY

## 2021-07-16 PROCEDURE — 77427 RADIATION TX MANAGEMENT X5: CPT | Performed by: RADIOLOGY

## 2021-07-19 ENCOUNTER — APPOINTMENT (OUTPATIENT)
Dept: RADIATION ONCOLOGY | Facility: CLINIC | Age: 51
End: 2021-07-19
Payer: COMMERCIAL

## 2021-07-19 PROCEDURE — 77412 RADIATION TX DELIVERY LVL 3: CPT | Performed by: RADIOLOGY

## 2021-07-20 ENCOUNTER — APPOINTMENT (OUTPATIENT)
Dept: RADIATION ONCOLOGY | Facility: CLINIC | Age: 51
End: 2021-07-20
Payer: COMMERCIAL

## 2021-07-20 PROCEDURE — 77412 RADIATION TX DELIVERY LVL 3: CPT | Performed by: RADIOLOGY

## 2021-07-21 ENCOUNTER — APPOINTMENT (OUTPATIENT)
Dept: RADIATION ONCOLOGY | Facility: CLINIC | Age: 51
End: 2021-07-21
Payer: COMMERCIAL

## 2021-07-21 PROCEDURE — 77412 RADIATION TX DELIVERY LVL 3: CPT | Performed by: RADIOLOGY

## 2021-07-22 ENCOUNTER — OFFICE VISIT (OUTPATIENT)
Dept: RADIATION ONCOLOGY | Facility: CLINIC | Age: 51
End: 2021-07-22
Payer: COMMERCIAL

## 2021-07-22 ENCOUNTER — APPOINTMENT (OUTPATIENT)
Dept: RADIATION ONCOLOGY | Facility: CLINIC | Age: 51
End: 2021-07-22
Payer: COMMERCIAL

## 2021-07-22 VITALS
OXYGEN SATURATION: 98 % | WEIGHT: 218 LBS | RESPIRATION RATE: 16 BRPM | DIASTOLIC BLOOD PRESSURE: 85 MMHG | SYSTOLIC BLOOD PRESSURE: 124 MMHG | HEART RATE: 80 BPM

## 2021-07-22 DIAGNOSIS — C49.9 SARCOMA OF SOFT TISSUE (H): Primary | ICD-10-CM

## 2021-07-22 PROCEDURE — 99207 PR DROP WITH A PROCEDURE: CPT | Performed by: RADIOLOGY

## 2021-07-22 PROCEDURE — 77412 RADIATION TX DELIVERY LVL 3: CPT | Performed by: RADIOLOGY

## 2021-07-22 ASSESSMENT — PAIN SCALES - GENERAL: PAINLEVEL: NO PAIN (0)

## 2021-07-22 NOTE — PROGRESS NOTES
[facility] PHYSICIANS  SPECIALIZING IN BREAKTHROUGHS  Radiation Oncology    On Treatment Visit Note      Imani Albert      Date: 2021   MRN: [id number]   : 1970  Diagnosis: Sarcoma      Reason for Visit:  On Radiation Treatment Visit     Treatment Summary to Date  Treatment Site: Left arm Current Dose: 1800/5000 cGy Fractions:       Chemotherapy  Chemo concurrent with radx?: No    Subjective: Ms. Morales is seen for her weekly on treatment visit today.  She is doing well.  Fatigue compared to last week is about the same.  No skin issues.     Nursing ROS:   Nutrition Alteration  Diet Type: Patient's Preference  Skin  Skin Reaction: 0 - No changes  Skin Intervention: using aquaphor BID        Cardiovascular  Respiratory effort: 1 - Normal - without distress        Psychosocial  Mood - Anxiety: 0 - Normal  Mood - Depression: 0 - Normal  Pyschosocial Note: feeling less fatigued this week  Pain Assessment  0-10 Pain Scale: 0      Objective:   /85 (BP Location: Right arm, Patient Position: Sitting, Cuff Size: Adult Large)   Pulse 80   Resp 16   Wt 98.9 kg (218 lb)   SpO2 98%   Gen: Appears well, in no acute distress  Skin: no erythema       Assessment:    Tolerating radiation therapy well.  All questions and concerns addressed.    Toxicities:  Fatigue: Grade 1: Fatigue relieved by rest  Pain: Grade 0: No toxicity  Dermatitis: Grade 0: No toxicity     Plan:   1. Continue current therapy.    2. Skin care discussed  3. Continue to monitor fatigue      Mosaiq chart and setup information reviewed      Medication Review  Med list reviewed with patient?: Yes  Med list printed and given: Offered and declined    Educational Topic Discussed  Education Instructions: radiation therapy side effects: fatigue, skin changes and skin cares    The patient was seen and assessed with staff, Dr. Long, who agrees with the above assessment and plan.      Maite Long  [phone] pager       CC  Patient Care Team:  Lillian Bui NP as PCP - General  YooChuyita Garcia, RN as Specialty Care Coordinator (Orthopedics)  Raman Turcios as Referring Physician  Abdirahman Rae MD as Assigned Musculoskeletal Provider  Maite Long MD as MD (Radiation Oncology)  Teodora Viera RN as Specialty Care Coordinator (Radiation Oncology)  Maite Long MD as Assigned Cancer Care Provider

## 2021-07-22 NOTE — LETTER
2021         RE: Imani Albert  [address]  [place] [address]        Dear Colleague,    Thank you for referring your patient, Imani Albert, to the [facility] RADIATION ONCOLOGY [place]. Please see a copy of my visit note below.    [facility] PHYSICIANS  SPECIALIZING IN BREAKTHROUGHS  Radiation Oncology    On Treatment Visit Note      Imani Albert      Date: 2021   MRN: [id number]   : 1970  Diagnosis: Sarcoma      Reason for Visit:  On Radiation Treatment Visit     Treatment Summary to Date  Treatment Site: Left arm Current Dose: 1800/5000 cGy Fractions:       Chemotherapy  Chemo concurrent with radx?: No    Subjective: Ms. Morales is seen for her weekly on treatment visit today.  She is doing well.  Fatigue compared to last week is about the same.  No skin issues.     Nursing ROS:   Nutrition Alteration  Diet Type: Patient's Preference  Skin  Skin Reaction: 0 - No changes  Skin Intervention: using aquaphor BID        Cardiovascular  Respiratory effort: 1 - Normal - without distress        Psychosocial  Mood - Anxiety: 0 - Normal  Mood - Depression: 0 - Normal  Pyschosocial Note: feeling less fatigued this week  Pain Assessment  0-10 Pain Scale: 0      Objective:   /85 (BP Location: Right arm, Patient Position: Sitting, Cuff Size: Adult Large)   Pulse 80   Resp 16   Wt 98.9 kg (218 lb)   SpO2 98%   Gen: Appears well, in no acute distress  Skin: no erythema       Assessment:    Tolerating radiation therapy well.  All questions and concerns addressed.    Toxicities:  Fatigue: Grade 1: Fatigue relieved by rest  Pain: Grade 0: No toxicity  Dermatitis: Grade 0: No toxicity     Plan:   1. Continue current therapy.    2. Skin care discussed  3. Continue to monitor fatigue      Mosaiq chart and setup information reviewed      Medication Review  Med list reviewed with patient?: Yes  Med list printed and given: Offered and  declined    Educational Topic Discussed  Education Instructions: radiation therapy side effects: fatigue, skin changes and skin cares    The patient was seen and assessed with staff, Dr. Long, who agrees with the above assessment and plan.      Maite Long  [phone] pager      CC  Patient Care Team:  Lillian Bui NP as PCP - General  Oyo-Chuyita Richardson RN as Specialty Care Coordinator (Orthopedics)  Raman Turcios as Referring Physician  Abdirahman Rae MD as Assigned Musculoskeletal Provider  Maite Long MD as MD (Radiation Oncology)  Teodora Viera RN as Specialty Care Coordinator (Radiation Oncology)  Maite Long MD as Assigned Cancer Care Provider          Again, thank you for allowing me to participate in the care of your patient.        Sincerely,        Maite Long MD

## 2021-07-23 ENCOUNTER — APPOINTMENT (OUTPATIENT)
Dept: RADIATION ONCOLOGY | Facility: CLINIC | Age: 51
End: 2021-07-23
Payer: COMMERCIAL

## 2021-07-23 PROCEDURE — 77427 RADIATION TX MANAGEMENT X5: CPT | Performed by: RADIOLOGY

## 2021-07-23 PROCEDURE — 77412 RADIATION TX DELIVERY LVL 3: CPT | Performed by: STUDENT IN AN ORGANIZED HEALTH CARE EDUCATION/TRAINING PROGRAM

## 2021-07-23 PROCEDURE — 77336 RADIATION PHYSICS CONSULT: CPT | Performed by: STUDENT IN AN ORGANIZED HEALTH CARE EDUCATION/TRAINING PROGRAM

## 2021-07-26 ENCOUNTER — APPOINTMENT (OUTPATIENT)
Dept: RADIATION ONCOLOGY | Facility: CLINIC | Age: 51
End: 2021-07-26
Payer: COMMERCIAL

## 2021-07-26 PROCEDURE — 77412 RADIATION TX DELIVERY LVL 3: CPT | Performed by: RADIOLOGY

## 2021-07-27 ENCOUNTER — APPOINTMENT (OUTPATIENT)
Dept: RADIATION ONCOLOGY | Facility: CLINIC | Age: 51
End: 2021-07-27
Payer: COMMERCIAL

## 2021-07-27 PROCEDURE — 77412 RADIATION TX DELIVERY LVL 3: CPT | Performed by: RADIOLOGY

## 2021-07-28 ENCOUNTER — APPOINTMENT (OUTPATIENT)
Dept: RADIATION ONCOLOGY | Facility: CLINIC | Age: 51
End: 2021-07-28
Payer: COMMERCIAL

## 2021-07-28 PROCEDURE — 77412 RADIATION TX DELIVERY LVL 3: CPT | Performed by: RADIOLOGY

## 2021-07-29 ENCOUNTER — OFFICE VISIT (OUTPATIENT)
Dept: RADIATION ONCOLOGY | Facility: CLINIC | Age: 51
End: 2021-07-29
Payer: COMMERCIAL

## 2021-07-29 ENCOUNTER — APPOINTMENT (OUTPATIENT)
Dept: RADIATION ONCOLOGY | Facility: CLINIC | Age: 51
End: 2021-07-29
Payer: COMMERCIAL

## 2021-07-29 VITALS
OXYGEN SATURATION: 97 % | HEART RATE: 71 BPM | SYSTOLIC BLOOD PRESSURE: 138 MMHG | TEMPERATURE: 97.8 F | RESPIRATION RATE: 18 BRPM | WEIGHT: 217.9 LBS | DIASTOLIC BLOOD PRESSURE: 86 MMHG

## 2021-07-29 DIAGNOSIS — C49.9 SARCOMA OF SOFT TISSUE (H): Primary | ICD-10-CM

## 2021-07-29 PROCEDURE — 99207 PR DROP WITH A PROCEDURE: CPT | Performed by: RADIOLOGY

## 2021-07-29 PROCEDURE — 77412 RADIATION TX DELIVERY LVL 3: CPT | Performed by: RADIOLOGY

## 2021-07-29 ASSESSMENT — PAIN SCALES - GENERAL: PAINLEVEL: NO PAIN (0)

## 2021-07-29 NOTE — PATIENT INSTRUCTIONS
Please contact [facility] RN with questions or concerns following today's appointment: [phone].    Thank you!

## 2021-07-29 NOTE — NURSING NOTE
In-basket message sent to Dr. Rae's nurse with update regarding planned completion date of radiation therapy of 8/13/2021 with request to assist in arranging follow-up with Dr. Rae.    Teodora Camilo, RN BSN OCN CBCN

## 2021-07-29 NOTE — LETTER
2021         RE: Imani Albert  [address]  [address]  [place] [address]        Dear Colleague,    Thank you for referring your patient, Imani Albert, to the [facility] RADIATION ONCOLOGY [place]. Please see a copy of my visit note below.    [facility] PHYSICIANS  SPECIALIZING IN BREAKTHROUGHS  Radiation Oncology    On Treatment Visit Note      Imani Albert      Date: 2021   MRN: [id number]   : 1970  Diagnosis: Sarcoma of the Left Arm      Reason for Visit:  On Radiation Treatment Visit     Treatment Summary to Date  Treatment Site: Left arm Current Dose: 2800/5000 cGy Fractions:       Chemotherapy  Chemo concurrent with radx?: No    Subjective: Ms. Morales is seen today for her weekly on treatment visit.  She endorses ongoing fatigue.  She states that she is not up and about as much as she normally is, but this is also due to the weather.  Her incision is clean dry and intact, with mild erythema and without desquamation.  She continues to use Vanicream 1-2 times daily.  She notes no other pain or swelling to the area.    Nursing ROS:   Nutrition Alteration  Diet Type: Patient's Preference  Skin  Skin Reaction: 1 - Faint erythema or dry desquamation (very faint, no desquamation)  Skin Intervention: patient reports using Vanicream 1-2 times daily        Cardiovascular  Respiratory effort: 1 - Normal - without distress        Psychosocial  Mood - Anxiety: 0 - Normal  Mood - Depression: 0 - Normal  Pyschosocial Note: increasing fatigue - reviewed fatigue management, reviewed hydration, protein intake, physical exercise and evening routines  Pain Assessment  0-10 Pain Scale: 0      Objective:   /86 (BP Location: Right arm, Patient Position: Chair, Cuff Size: Adult Large)   Pulse 71   Temp 97.8  F (36.6  C) (Oral)   Resp 18   Wt 98.8 kg (217 lb 14.4 oz)   SpO2 97%   Gen: Well-appearing 51-year-old female resting comfortably in exam  chair  Skin: Left upper extremity, posterior arm incision is clean dry intact, mild erythema along the incision without evidence of desquamation.    Assessment:    Tolerating radiation therapy well.  All questions and concerns addressed.    Toxicities:  Fatigue: Grade 1: Fatigue relieved by rest  Pain: Grade 0: No toxicity  Dermatitis: Grade 1: Faint erythema or dry desquamation     Plan:   1. Continue current therapy.    2. Skin care discussed  3. Fatigue is likely multifactorial.  Discussed continued exercise and ambulation and frequency, but shorter duration.  4. We will communicate with our orthopedic colleagues about completion of radiotherapy in the coming weeks.      Mosaiq chart and setup information reviewed  MVCT/IGRT images checked    Medication Review  Med list reviewed with patient?: Yes  Med list printed and given: Offered and declined    Educational Topic Discussed  Education Instructions: reviewed    The patient was seen and assessed with staff, Dr. Long, who agrees with the above assessment and plan.      Karla Diane MD PGY3  Department of Radiation Oncology  [phone]    I saw the patient with the resident.  I agree with the resident note and plan of care.      Maite Long MD          Again, thank you for allowing me to participate in the care of your patient.        Sincerely,        Maite Long MD

## 2021-07-29 NOTE — PROGRESS NOTES
[facility] PHYSICIANS  SPECIALIZING IN BREAKTHROUGHS  Radiation Oncology    On Treatment Visit Note      Imani Albert      Date: 2021   MRN: [id number]   : 1970  Diagnosis: Sarcoma of the Left Arm      Reason for Visit:  On Radiation Treatment Visit     Treatment Summary to Date  Treatment Site: Left arm Current Dose: 2800/5000 cGy Fractions:       Chemotherapy  Chemo concurrent with radx?: No    Subjective: Ms. Morales is seen today for her weekly on treatment visit.  She endorses ongoing fatigue.  She states that she is not up and about as much as she normally is, but this is also due to the weather.  Her incision is clean dry and intact, with mild erythema and without desquamation.  She continues to use Vanicream 1-2 times daily.  She notes no other pain or swelling to the area.    Nursing ROS:   Nutrition Alteration  Diet Type: Patient's Preference  Skin  Skin Reaction: 1 - Faint erythema or dry desquamation (very faint, no desquamation)  Skin Intervention: patient reports using Vanicream 1-2 times daily        Cardiovascular  Respiratory effort: 1 - Normal - without distress        Psychosocial  Mood - Anxiety: 0 - Normal  Mood - Depression: 0 - Normal  Pyschosocial Note: increasing fatigue - reviewed fatigue management, reviewed hydration, protein intake, physical exercise and evening routines  Pain Assessment  0-10 Pain Scale: 0      Objective:   /86 (BP Location: Right arm, Patient Position: Chair, Cuff Size: Adult Large)   Pulse 71   Temp 97.8  F (36.6  C) (Oral)   Resp 18   Wt 98.8 kg (217 lb 14.4 oz)   SpO2 97%   Gen: Well-appearing 51-year-old female resting comfortably in exam chair  Skin: Left upper extremity, posterior arm incision is clean dry intact, mild erythema along the incision without evidence of desquamation.    Assessment:    Tolerating radiation therapy well.  All questions and concerns addressed.    Toxicities:  Fatigue: Grade 1: Fatigue  relieved by rest  Pain: Grade 0: No toxicity  Dermatitis: Grade 1: Faint erythema or dry desquamation     Plan:   1. Continue current therapy.    2. Skin care discussed  3. Fatigue is likely multifactorial.  Discussed continued exercise and ambulation and frequency, but shorter duration.  4. We will communicate with our orthopedic colleagues about completion of radiotherapy in the coming weeks.      Mosaiq chart and setup information reviewed  MVCT/IGRT images checked    Medication Review  Med list reviewed with patient?: Yes  Med list printed and given: Offered and declined    Educational Topic Discussed  Education Instructions: reviewed    The patient was seen and assessed with staff, Dr. Long, who agrees with the above assessment and plan.      Karla Diane MD PGY3  Department of Radiation Oncology  [phone]    I saw the patient with the resident.  I agree with the resident note and plan of care.      Maite Long MD

## 2021-07-30 ENCOUNTER — APPOINTMENT (OUTPATIENT)
Dept: RADIATION ONCOLOGY | Facility: CLINIC | Age: 51
End: 2021-07-30
Payer: COMMERCIAL

## 2021-07-30 PROCEDURE — 77336 RADIATION PHYSICS CONSULT: CPT | Performed by: RADIOLOGY

## 2021-07-30 PROCEDURE — 77427 RADIATION TX MANAGEMENT X5: CPT | Performed by: RADIOLOGY

## 2021-07-30 PROCEDURE — 77412 RADIATION TX DELIVERY LVL 3: CPT | Performed by: RADIOLOGY

## 2021-08-02 ENCOUNTER — APPOINTMENT (OUTPATIENT)
Dept: RADIATION ONCOLOGY | Facility: CLINIC | Age: 51
End: 2021-08-02
Payer: COMMERCIAL

## 2021-08-02 PROCEDURE — 77412 RADIATION TX DELIVERY LVL 3: CPT | Performed by: RADIOLOGY

## 2021-08-03 ENCOUNTER — APPOINTMENT (OUTPATIENT)
Dept: RADIATION ONCOLOGY | Facility: CLINIC | Age: 51
End: 2021-08-03
Payer: COMMERCIAL

## 2021-08-03 PROCEDURE — 77412 RADIATION TX DELIVERY LVL 3: CPT | Performed by: RADIOLOGY

## 2021-08-04 ENCOUNTER — APPOINTMENT (OUTPATIENT)
Dept: RADIATION ONCOLOGY | Facility: CLINIC | Age: 51
End: 2021-08-04
Payer: COMMERCIAL

## 2021-08-04 PROCEDURE — 77412 RADIATION TX DELIVERY LVL 3: CPT | Performed by: RADIOLOGY

## 2021-08-05 ENCOUNTER — OFFICE VISIT (OUTPATIENT)
Dept: RADIATION ONCOLOGY | Facility: CLINIC | Age: 51
End: 2021-08-05
Payer: COMMERCIAL

## 2021-08-05 ENCOUNTER — APPOINTMENT (OUTPATIENT)
Dept: RADIATION ONCOLOGY | Facility: CLINIC | Age: 51
End: 2021-08-05
Payer: COMMERCIAL

## 2021-08-05 VITALS
TEMPERATURE: 98 F | HEART RATE: 71 BPM | DIASTOLIC BLOOD PRESSURE: 84 MMHG | SYSTOLIC BLOOD PRESSURE: 130 MMHG | RESPIRATION RATE: 18 BRPM | WEIGHT: 221.7 LBS | OXYGEN SATURATION: 96 %

## 2021-08-05 DIAGNOSIS — C49.9 SARCOMA OF SOFT TISSUE (H): Primary | ICD-10-CM

## 2021-08-05 PROCEDURE — 99207 PR DROP WITH A PROCEDURE: CPT | Performed by: RADIOLOGY

## 2021-08-05 PROCEDURE — 77412 RADIATION TX DELIVERY LVL 3: CPT | Performed by: RADIOLOGY

## 2021-08-05 ASSESSMENT — PAIN SCALES - GENERAL: PAINLEVEL: NO PAIN (0)

## 2021-08-05 NOTE — PATIENT INSTRUCTIONS
Please contact [facility] RN with questions or concerns following today's appointment: [phone].    Thank you!

## 2021-08-05 NOTE — PROGRESS NOTES
[facility] PHYSICIANS  SPECIALIZING IN BREAKTHROUGHS  Radiation Oncology    On Treatment Visit Note      Imani Albert      Date: Aug 5, 2021   MRN: [id number]   : 1970       Diagnosis: Liposarcoma of the left posterior arm  Reason for Visit:  On Radiation Treatment Visit     Treatment Summary to Date   Site: Left arm Dose: 3800/5000 cGy Fraction:      Subjective:  Ms. Albert is seen today for her weekly on-treatment visit.  Her energy level is stable. She maintains her regimen of skin care with Aquaphor.  Her incision is doing well and notes no pain or itchiness to the treatment field.  She did inquire about a right sided nodule that she just noticed on the right posterior arm.  She said she does not know when this popped up, but this is similar to how the presentation of her left posterior arm occurred.  It is not itchy, painful, or fluctuant.        Objective:     /84 (BP Location: Right arm, Patient Position: Chair, Cuff Size: Adult Large)   Pulse 71   Temp 98  F (36.7  C) (Oral)   Resp 18   Wt 100.6 kg (221 lb 11.2 oz)   SpO2 96%     Gen: Well appearing 51 year old female resting comfortably in exam chair  Skin: Incision of the left posterior forearm is clean, dry and intact with mild erythema and hyperpigmentation overlying the region consistent with treatment response.  Right distal posterior arm with a hyperpigmented nodule measuring approximately 1 x 1 cm without associated erythema or fluctuance.    Labs:  No recent labs for review    Assessment:    Tolerating radiation therapy well.  All questions and concerns addressed.    Toxicities:  Fatigue: Grade 1: Fatigue relieved by rest  Pain: Grade 0: No toxicity  Dermatitis: Grade 1: Faint erythema or dry desquamation     Plan:   1. Continue current therapy.    2. Skin care discussed.  Continue at least twice daily application of Aquaphor to the incision site in treatment field  3. Fatigue: Stable. Continue to  monitor  4. Nodule of the right arm: We reviewed her CT scan from her recent PET.  The field-of-view does obscure the right posterior arm; however, there does not appear to be any evidence of concerning disease in the right arm.  The appearance and quality of the nodule in the right arm appears benign; however, we will make Dr. Rae's team aware of this for possible biopsy during her surgical resection of the left posterior arm.  5. Will complete radiotherapy on 8/13/2021.  We have communicated with Dr. Rae's clinic regarding seeing her back for discussion of surgical resection.  We have requested to follow that she follow-up with radiation oncology after surgery.  We gave her the option of either coming to the [facility] to continue follow-up with Dr. Long or to continue at [facility] under the surveillance of Dr. Clay.  She has elected to stay at [facility].    Mosaiq chart and setup information reviewed      The patient was seen and assessed with staff, Dr. Long, who agrees with the above assessment and plan.      Karla Diane MD PGY3  Department of Radiation Oncology  [phone]    I saw the patient with the resident.  I agree with the resident note and plan of care.      Maite Long MD

## 2021-08-05 NOTE — LETTER
2021         RE: Imani Albert  [address]  [address]  [place] [address]        Dear Colleague,    Thank you for referring your patient, Imani Albert, to the [facility] RADIATION ONCOLOGY [place]. Please see a copy of my visit note below.    [facility] PHYSICIANS  SPECIALIZING IN BREAKTHROUGHS  Radiation Oncology    On Treatment Visit Note      Imani Albert      Date: Aug 5, 2021   MRN: [id number]   : 1970       Diagnosis: Liposarcoma of the left posterior arm  Reason for Visit:  On Radiation Treatment Visit     Treatment Summary to Date   Site: Left arm Dose: 3800/5000 cGy Fraction:      Subjective:  Ms. Albert is seen today for her weekly on-treatment visit.  Her energy level is stable. She maintains her regimen of skin care with Aquaphor.  Her incision is doing well and notes no pain or itchiness to the treatment field.  She did inquire about a right sided nodule that she just noticed on the right posterior arm.  She said she does not know when this popped up, but this is similar to how the presentation of her left posterior arm occurred.  It is not itchy, painful, or fluctuant.        Objective:     /84 (BP Location: Right arm, Patient Position: Chair, Cuff Size: Adult Large)   Pulse 71   Temp 98  F (36.7  C) (Oral)   Resp 18   Wt 100.6 kg (221 lb 11.2 oz)   SpO2 96%     Gen: Well appearing 51 year old female resting comfortably in exam chair  Skin: Incision of the left posterior forearm is clean, dry and intact with mild erythema and hyperpigmentation overlying the region consistent with treatment response.  Right distal posterior arm with a hyperpigmented nodule measuring approximately 1 x 1 cm without associated erythema or fluctuance.    Labs:  No recent labs for review    Assessment:    Tolerating radiation therapy well.  All questions and concerns addressed.    Toxicities:  Fatigue: Grade 1: Fatigue relieved by rest  Pain: Grade 0: No  toxicity  Dermatitis: Grade 1: Faint erythema or dry desquamation     Plan:   1. Continue current therapy.    2. Skin care discussed.  Continue at least twice daily application of Aquaphor to the incision site in treatment field  3. Fatigue: Stable. Continue to monitor  4. Nodule of the right arm: We reviewed her CT scan from her recent PET.  The field-of-view does obscure the right posterior arm; however, there does not appear to be any evidence of concerning disease in the right arm.  The appearance and quality of the nodule in the right arm appears benign; however, we will make Dr. Rae's team aware of this for possible biopsy during her surgical resection of the left posterior arm.  5. Will complete radiotherapy on 8/13/2021.  We have communicated with Dr. Rae's clinic regarding seeing her back for discussion of surgical resection.  We have requested to follow that she follow-up with radiation oncology after surgery.  We gave her the option of either coming to the [facility] to continue follow-up with Dr. Long or to continue at [facility] under the surveillance of Dr. Clay.  She has elected to stay at [facility].    Mosaiq chart and setup information reviewed      The patient was seen and assessed with staff, Dr. Long, who agrees with the above assessment and plan.      Karla Diane MD PGY3  Department of Radiation Oncology  [phone]    I saw the patient with the resident.  I agree with the resident note and plan of care.      Maite Long MD        Again, thank you for allowing me to participate in the care of your patient.        Sincerely,        Maite Long MD

## 2021-08-06 ENCOUNTER — APPOINTMENT (OUTPATIENT)
Dept: RADIATION ONCOLOGY | Facility: CLINIC | Age: 51
End: 2021-08-06
Payer: COMMERCIAL

## 2021-08-06 PROCEDURE — 77412 RADIATION TX DELIVERY LVL 3: CPT | Performed by: RADIOLOGY

## 2021-08-06 PROCEDURE — 77336 RADIATION PHYSICS CONSULT: CPT | Performed by: RADIOLOGY

## 2021-08-06 PROCEDURE — 77427 RADIATION TX MANAGEMENT X5: CPT | Performed by: RADIOLOGY

## 2021-08-09 ENCOUNTER — APPOINTMENT (OUTPATIENT)
Dept: RADIATION ONCOLOGY | Facility: CLINIC | Age: 51
End: 2021-08-09
Payer: COMMERCIAL

## 2021-08-09 PROCEDURE — 77412 RADIATION TX DELIVERY LVL 3: CPT | Performed by: RADIOLOGY

## 2021-08-10 ENCOUNTER — APPOINTMENT (OUTPATIENT)
Dept: RADIATION ONCOLOGY | Facility: CLINIC | Age: 51
End: 2021-08-10
Payer: COMMERCIAL

## 2021-08-10 PROCEDURE — 77412 RADIATION TX DELIVERY LVL 3: CPT | Performed by: RADIOLOGY

## 2021-08-11 ENCOUNTER — APPOINTMENT (OUTPATIENT)
Dept: RADIATION ONCOLOGY | Facility: CLINIC | Age: 51
End: 2021-08-11
Payer: COMMERCIAL

## 2021-08-11 PROCEDURE — 77412 RADIATION TX DELIVERY LVL 3: CPT | Performed by: RADIOLOGY

## 2021-08-12 ENCOUNTER — OFFICE VISIT (OUTPATIENT)
Dept: RADIATION ONCOLOGY | Facility: CLINIC | Age: 51
End: 2021-08-12
Payer: COMMERCIAL

## 2021-08-12 ENCOUNTER — APPOINTMENT (OUTPATIENT)
Dept: RADIATION ONCOLOGY | Facility: CLINIC | Age: 51
End: 2021-08-12
Payer: COMMERCIAL

## 2021-08-12 VITALS
BODY MASS INDEX: 40.61 KG/M2 | HEART RATE: 70 BPM | RESPIRATION RATE: 18 BRPM | SYSTOLIC BLOOD PRESSURE: 139 MMHG | DIASTOLIC BLOOD PRESSURE: 79 MMHG | WEIGHT: 220.7 LBS | OXYGEN SATURATION: 96 % | HEIGHT: 62 IN

## 2021-08-12 DIAGNOSIS — E66.01 MORBID OBESITY (H): ICD-10-CM

## 2021-08-12 DIAGNOSIS — C49.9 SARCOMA OF SOFT TISSUE (H): Primary | ICD-10-CM

## 2021-08-12 PROCEDURE — 99207 PR DROP WITH A PROCEDURE: CPT | Performed by: RADIOLOGY

## 2021-08-12 PROCEDURE — 77412 RADIATION TX DELIVERY LVL 3: CPT | Performed by: RADIOLOGY

## 2021-08-12 ASSESSMENT — MIFFLIN-ST. JEOR: SCORE: 1574.34

## 2021-08-12 ASSESSMENT — PAIN SCALES - GENERAL: PAINLEVEL: NO PAIN (0)

## 2021-08-12 NOTE — PROGRESS NOTES
[facility] PHYSICIANS  SPECIALIZING IN BREAKTHROUGHS  Radiation Oncology     On Treatment Visit Note          Imani Albert                                                             Date: 2021         MRN: [id number]   : 1970  Diagnosis: Sarcoma of the Left Arm        Reason for Visit:  On Radiation Treatment Visit      Treatment Summary to Date  Treatment Site: Left arm Current Dose: 4800/5000 cGy Fractions:        Chemotherapy  Chemo concurrent with radx?: No     Subjective: Ms. Morales is seen today for her weekly on treatment visit.  She endorses ongoing fatigue.  She states that she is not up and about as much as she normally is, but this is also due to the weather.  Her incision is clean dry and intact, with mild erythema and without desquamation.  She continues to use Vanicream 1-2 times daily.  She notes no other pain or swelling to the area.  She will be done with radiation tomorrow.    Nursing ROS:   Nutrition Alteration  Diet Type: Patient's Preference  Skin  Skin Reaction: 1 - Faint erythema or dry desquamation (very faint, no desquamation)  Skin Intervention: patient reports using Vanicream 1-2 times daily     Cardiovascular  Respiratory effort: 1 - Normal - without distress      Psychosocial  Mood - Anxiety: 0 - Normal  Mood - Depression: 0 - Normal  Pyschosocial Note: increasing fatigue - reviewed fatigue management, reviewed hydration, protein intake, physical exercise and evening routines  Pain Assessment  0-10 Pain Scale: 0     Objective:   /79 (BP Location: Right arm, Patient Position: Chair, Cuff Size: Adult Large)   Pulse 70   Temp 97.8  F (36.6  C) (Oral)   Resp 18   Wt 220pound   SpO2 97%   Gen: Well-appearing 51-year-old female resting comfortably in exam chair  Skin: Left upper extremity, posterior arm incision is clean dry intact, mild erythema along the incision without evidence of desquamation.     Assessment:    Tolerating radiation therapy  well.  All questions and concerns addressed.     Toxicities:  Fatigue: Grade 1: Fatigue relieved by rest  Pain: Grade 0: No toxicity  Dermatitis: Grade 1: Faint erythema or dry desquamation      Plan:   1. Continue current therapy. She has one more fractopn of RT left.   2. Skin care discussed  3. Fatigue is likely multifactorial.  Discussed continued exercise and ambulation and frequency, but shorter duration.  We will communicate with our orthopedic colleagues about completion of radiotherapy and set up clic visit for surgery.      Mosaiq chart and setup information reviewed  MVCT/IGRT images checked     Medication Review  Med list reviewed with patient?: Yes  Med list printed and given: Offered and declined       Wilfrido Daniel MD  Radiation Oncology

## 2021-08-12 NOTE — LETTER
2021         RE: Imani Albert  [address]  [address]  [facility] [address]        Dear Colleague,    Thank you for referring your patient, Imani Albert, to the [facility] [place]. Please see a copy of my visit note below.    [facility] PHYSICIANS  SPECIALIZING IN BREAKTHROUGHS  Radiation Oncology     On Treatment Visit Note          Imani Albert                                                             Date: 2021         MRN: [id number]   : 1970  Diagnosis: Sarcoma of the Left Arm        Reason for Visit:  On Radiation Treatment Visit      Treatment Summary to Date  Treatment Site: Left arm Current Dose: 4800/5000 cGy Fractions:        Chemotherapy  Chemo concurrent with radx?: No     Subjective: Ms. Morales is seen today for her weekly on treatment visit.  She endorses ongoing fatigue.  She states that she is not up and about as much as she normally is, but this is also due to the weather.  Her incision is clean dry and intact, with mild erythema and without desquamation.  She continues to use Vanicream 1-2 times daily.  She notes no other pain or swelling to the area.  She will be done with radiation tomorrow.    Nursing ROS:   Nutrition Alteration  Diet Type: Patient's Preference  Skin  Skin Reaction: 1 - Faint erythema or dry desquamation (very faint, no desquamation)  Skin Intervention: patient reports using Vanicream 1-2 times daily     Cardiovascular  Respiratory effort: 1 - Normal - without distress      Psychosocial  Mood - Anxiety: 0 - Normal  Mood - Depression: 0 - Normal  Pyschosocial Note: increasing fatigue - reviewed fatigue management, reviewed hydration, protein intake, physical exercise and evening routines  Pain Assessment  0-10 Pain Scale: 0     Objective:   /79 (BP Location: Right arm, Patient Position: Chair, Cuff Size: Adult Large)   Pulse 70   Temp 97.8  F (36.6  C) (Oral)   Resp 18   Wt 220pound    SpO2 97%   Gen: Well-appearing 51-year-old female resting comfortably in exam chair  Skin: Left upper extremity, posterior arm incision is clean dry intact, mild erythema along the incision without evidence of desquamation.     Assessment:    Tolerating radiation therapy well.  All questions and concerns addressed.     Toxicities:  Fatigue: Grade 1: Fatigue relieved by rest  Pain: Grade 0: No toxicity  Dermatitis: Grade 1: Faint erythema or dry desquamation      Plan:   1. Continue current therapy. She has one more fractopn of RT left.   2. Skin care discussed  3. Fatigue is likely multifactorial.  Discussed continued exercise and ambulation and frequency, but shorter duration.  We will communicate with our orthopedic colleagues about completion of radiotherapy and set up clic visit for surgery.      Mosaiq chart and setup information reviewed  MVCT/IGRT images checked     Medication Review  Med list reviewed with patient?: Yes  Med list printed and given: Offered and declined       Wilfrido Daniel MD  Radiation Oncology           Again, thank you for allowing me to participate in the care of your patient.        Sincerely,        Wilfrido Daniel MD

## 2021-08-13 ENCOUNTER — APPOINTMENT (OUTPATIENT)
Dept: RADIATION ONCOLOGY | Facility: CLINIC | Age: 51
End: 2021-08-13
Payer: COMMERCIAL

## 2021-08-13 PROCEDURE — 77427 RADIATION TX MANAGEMENT X5: CPT | Performed by: RADIOLOGY

## 2021-08-13 PROCEDURE — 77412 RADIATION TX DELIVERY LVL 3: CPT | Performed by: RADIOLOGY

## 2021-08-13 PROCEDURE — 77336 RADIATION PHYSICS CONSULT: CPT | Performed by: RADIOLOGY

## 2021-08-17 ENCOUNTER — TELEPHONE (OUTPATIENT)
Dept: ORTHOPEDICS | Facility: CLINIC | Age: 51
End: 2021-08-17

## 2021-08-17 DIAGNOSIS — C49.9 SARCOMA OF SOFT TISSUE (H): Primary | ICD-10-CM

## 2021-08-17 NOTE — TELEPHONE ENCOUNTER
RN called and spoke with Imani.  Her last day of radiation ws on  08-13-21.  Based on this date.  MRI of the left arm to be done at [place] on 09-02-21 at 0845.  She will then see Dr. Rae at 1400 for imaging review and surgery discussion with anticipated OR date of 09-10-21.  Imani would like a little later time for the MRI, so Rn will reschedule and call her back.

## 2021-08-18 ENCOUNTER — ONCOLOGY VISIT (OUTPATIENT)
Dept: RADIATION ONCOLOGY | Facility: CLINIC | Age: 51
End: 2021-08-18

## 2021-08-18 NOTE — TELEPHONE ENCOUNTER
FUTURE VISIT INFORMATION      SURGERY INFORMATION:    Date: 9/10/2021    Location: TBD - information not available at the time of previsit     Surgeon:  Abdirahman Rae MD    Anesthesia Type:  TBD - information not available at the time of previsit     Procedure: TBD - information not available at the time of previsit     Consult: 6/15/2021    RECORDS REQUESTED FROM:       Primary Care Provider: Lillian Bui NP ([facility] )    Most recent EKG+ Tracin2013 ([facility])     Most recent ECHO: 2013 ([facility])     Most recent Sleep Study: 2009 ([facility])     Records Requested  21    Facility  [address] [address]  [phone]  Fax: [phone]   Outcome Sent a fax for sleep study and EKG    2021 11:24AM received sleep study and EKG, sent to [facility]

## 2021-08-20 NOTE — PROCEDURES
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 Raman Turcios at [facility].     She reports several years of the small stable mass in the posterior aspect of her left arm which had enlarged   over the past several months. There was no pain associated with this mass. Her local   treatment physician recommended surgical excision as as the mass was bothering her. This was performed as   an outpatient on 21 under local anesthesia.  An elliptical 6.3 x 2.5 cm skin incision was made after local   anesthetic was injected, which was the posterior middle of the left upper arm.     The histopathology is reported to show 4 x 3 x 3 cm, high-grade pleomorphic liposarcoma involving the   dermis and subcutaneous tissue measuring 4 cm in greatest diameter with positive surgical   margins (at un-oriented peripheral and deep surgical margins).     She was referred to Dr Rae who recommended an MRI and PET for staging then preoperative RT followed   by re-excision of the tumor bed (if no metastasis found).    She underwent the MRI  revealing subcutaneous edema and enhancement, presumably  related to   combination of postsurgical change and residual tumors. This extends approximately 5 cm in craniocaudal   dimension. This did not extend to investing layer of deep fascia. Additionally a small (6 x 6   mm) hyperintense  enhancing focus within the proximal humeral shaft, nonspecific was seen.      Details of the treatments summarized below are found in records kept in the Department of Radiation Oncology at [place].     Treatment Summary:  Radiation Oncology - Course: 1 Protocol:   Treatment Site Current Dose Modality From To Elapsed Days Fx.  1 Left Arm  5,000 cGy e12  7/12/2021 8/13/2021  32 25          Dose per Fraction:      200   Total Dose:      5000        COMMENTS:      ED visits/hspitalizations:na  Missed treatments:no     Acute Toxicity Profile by CTC v5.0:         none           PAIN MANAGEMENT:              na                FOLLOW UP PLAN:             She will follow up with Dr Rae for re-excision of the tumor bed in 3 weeks    Surgery in approx. 4 weeks.                Staff Physician: Maite Long M.D.  Physicist: Alexandrea Chisholm MS     CC: , MD Abdirahman Rae MD  , MD  , MD              Radiation Oncology [id number] [address] [place] [id number] Phone: [phone]

## 2021-08-23 ENCOUNTER — PATIENT OUTREACH (OUTPATIENT)
Dept: RADIATION ONCOLOGY | Facility: CLINIC | Age: 51
End: 2021-08-23

## 2021-08-23 ENCOUNTER — TELEPHONE (OUTPATIENT)
Dept: ORTHOPEDICS | Facility: CLINIC | Age: 51
End: 2021-08-23

## 2021-08-23 DIAGNOSIS — L58.9 RADIATION DERMATITIS: Primary | ICD-10-CM

## 2021-08-23 RX ORDER — HYDROCORTISONE 2.5 %
CREAM (GRAM) TOPICAL 2 TIMES DAILY
Qty: 30 G | Refills: 1 | Status: SHIPPED | OUTPATIENT
Start: 2021-08-23 | End: 2021-09-02

## 2021-08-23 NOTE — TELEPHONE ENCOUNTER
RN called and left a voice message for Imani. Her appt for the H&P is virtual, I was not able to secure an in person appt for that day.

## 2021-08-23 NOTE — TELEPHONE ENCOUNTER
Received [employer] message from patient with picture reporting skin changes and pain related to recent radiation treatment.  Patient completed radiation treatment on 8/13/2021.  Message forwarded to Dr. Long and Dr. Long sent in prescription for Hydrocortisone 2.5% cream.  This RN contacted patient, reviewed continued skin cares with Hydrocortisone 2.5% cream two times daily followed by Aquaphor application two to three times daily.  Patient reports she is taking Ibuprofen 800 mg po one time daily, reviewed taking two times daily as needed.  Also reviewed Tylenol 1,000 mg po every six hours as needed for pain as patient reports she intermittently takes Tylenol for headaches.  Reviewed max dosing of 4,000 mg per day with Tylenol and informed patient not to exceed max dosing.  Reviewed cool packs with patient for discomfort.  Patient verbalized understanding of all information and had no questions at this time.  This RN reviewed upcoming nurse only telephone call on Thursday, 8/26/2021 at 1000, patient aware of appointment, confirmed details and had no questions at this time.    Teodora Camilo RN BSN OCN CBCN

## 2021-08-23 NOTE — PATIENT INSTRUCTIONS
Please contact [facility] RN with questions or concerns following today's appointment: [phone].    Thank you!

## 2021-08-26 DIAGNOSIS — L58.9 RADIATION DERMATITIS: Primary | ICD-10-CM

## 2021-08-26 DIAGNOSIS — L58.9 RADIATION DERMATITIS: ICD-10-CM

## 2021-08-26 RX ORDER — SILVER SULFADIAZINE 10 MG/G
CREAM TOPICAL 2 TIMES DAILY
Qty: 400 G | Refills: 0 | Status: SHIPPED | OUTPATIENT
Start: 2021-08-26 | End: 2021-08-26

## 2021-08-26 RX ORDER — SILVER SULFADIAZINE 10 MG/G
CREAM TOPICAL 2 TIMES DAILY
Qty: 400 G | Refills: 0 | Status: SHIPPED | OUTPATIENT
Start: 2021-08-26 | End: 2021-09-02

## 2021-08-30 ENCOUNTER — ALLIED HEALTH/NURSE VISIT (OUTPATIENT)
Dept: RADIATION ONCOLOGY | Facility: CLINIC | Age: 51
End: 2021-08-30
Payer: COMMERCIAL

## 2021-08-30 DIAGNOSIS — L58.9 RADIATION DERMATITIS: Primary | ICD-10-CM

## 2021-08-30 DIAGNOSIS — C49.9 SARCOMA OF SOFT TISSUE (H): ICD-10-CM

## 2021-08-30 RX ORDER — OXYCODONE AND ACETAMINOPHEN 5; 325 MG/1; MG/1
1 TABLET ORAL EVERY 6 HOURS PRN
Qty: 15 TABLET | Refills: 0 | Status: SHIPPED | OUTPATIENT
Start: 2021-08-30 | End: 2021-09-10

## 2021-08-30 RX ORDER — SILVER 32 PPM
1 GEL (GRAM) TOPICAL 2 TIMES DAILY PRN
Qty: 28.35 G | Refills: 1 | Status: SHIPPED | OUTPATIENT
Start: 2021-08-30 | End: 2024-02-27

## 2021-08-30 ASSESSMENT — PAIN SCALES - GENERAL: PAINLEVEL: WORST PAIN (10)

## 2021-08-30 NOTE — PATIENT INSTRUCTIONS
Please contact [facility] RN with questions or concerns following today's appointment: [phone].    Thank you!

## 2021-08-30 NOTE — NURSING NOTE
"This RN contacted patient today to follow-up regarding continued skin changes and skin cares since completing radiation treatment.  Patient completed radiation treatment to left arm on 8/13/2021.  Patient rates her pain \"20/10\", she reports it is very \"sore\" with intermittent sharp shooting pains.  Patient reports that she had a very difficult time sleeping last night and reports \"I don't think I can take much more of this pain\".  Patient reports she has been taking Tylenol 1,000 mg po three times daily and Ibuprofen 800 mg po at night.  Patient reports she has been using Silvadene two times daily and Aquaphor four times daily, however, she reports stinging and sharp shooting pains when applying these creams and lotions to her skin, therefore is hesitant to continue to use them.  Patient reports \"the only thing that gives me some relief is soaking my arm in a lukewarm tub of water\".  Patient denies fevers.  This RN informed patient that information would be sent to Dr. Long for further review and recommendations and either Dr. Long or this RN will be in contact with patient.  Patient verbalized understanding of all information and had no additional questions at this time.    Information sent to Dr. Long via [employer] in-basket with request for review and recommendations.    Teodora Camilo, RN BSN OCN CBCN      "

## 2021-08-30 NOTE — NURSING NOTE
Reviewed with Dr. Long, patient was contacted by this RN with recommendations for Percocet one tablet po every six hours for pain.  Reviewed Ibuprofen dosing with patient, two tablets by mouth every six hours as needed.  Reviewed max limits of Tylenol and advised to stop taking separate Tylenol while taking Percocet.  Reviewed cool compresses 15 minutes on and then off again, every two hours as needed.  Reviewed skin cares, stop Silvadene cream and continue with Aquaphor or Vanicream application four times daily.  Reviewed prescription for Silvrstat from Dr. Long, if not too expensive, may start using with applying to skin first and following with Aquaphor or Vanicream.  Reviewed no soap to area of radiation skin as not to further irritate healing skin.  Reviewed side effect of constipation with Percocet and reviewed taking Senna-S po one tablet two times daily, increase as needed according to package instructions.   Patient verbalized understanding of all information.  Patient will return to clinic on Thursday, 9/2/2021 at 1030 for Nurse Only skin check in clinic.  Patient confirmed and had no questions at this time.    Teodora Camilo, RN BSN OCN CBCN

## 2021-09-02 ENCOUNTER — PRE VISIT (OUTPATIENT)
Dept: SURGERY | Facility: CLINIC | Age: 51
End: 2021-09-02

## 2021-09-02 ENCOUNTER — LAB (OUTPATIENT)
Dept: LAB | Facility: CLINIC | Age: 51
End: 2021-09-02
Payer: COMMERCIAL

## 2021-09-02 ENCOUNTER — OFFICE VISIT (OUTPATIENT)
Dept: ORTHOPEDICS | Facility: CLINIC | Age: 51
End: 2021-09-02
Payer: COMMERCIAL

## 2021-09-02 ENCOUNTER — ALLIED HEALTH/NURSE VISIT (OUTPATIENT)
Dept: RADIATION ONCOLOGY | Facility: CLINIC | Age: 51
End: 2021-09-02
Payer: COMMERCIAL

## 2021-09-02 ENCOUNTER — ANCILLARY PROCEDURE (OUTPATIENT)
Dept: MRI IMAGING | Facility: CLINIC | Age: 51
End: 2021-09-02
Attending: ORTHOPAEDIC SURGERY
Payer: COMMERCIAL

## 2021-09-02 ENCOUNTER — VIRTUAL VISIT (OUTPATIENT)
Dept: SURGERY | Facility: CLINIC | Age: 51
End: 2021-09-02
Payer: COMMERCIAL

## 2021-09-02 DIAGNOSIS — C49.9 SARCOMA OF SOFT TISSUE (H): ICD-10-CM

## 2021-09-02 DIAGNOSIS — C49.9 SARCOMA OF SOFT TISSUE (H): Primary | ICD-10-CM

## 2021-09-02 DIAGNOSIS — Z01.818 PREOP EXAMINATION: ICD-10-CM

## 2021-09-02 DIAGNOSIS — C49.9 SARCOMA (H): ICD-10-CM

## 2021-09-02 DIAGNOSIS — L58.9 RADIATION DERMATITIS: Primary | ICD-10-CM

## 2021-09-02 DIAGNOSIS — Z01.818 PREOP EXAMINATION: Primary | ICD-10-CM

## 2021-09-02 LAB
ANION GAP SERPL CALCULATED.3IONS-SCNC: 7 MMOL/L (ref 3–14)
BUN SERPL-MCNC: 13 MG/DL (ref 7–30)
CALCIUM SERPL-MCNC: 9.3 MG/DL (ref 8.5–10.1)
CHLORIDE BLD-SCNC: 105 MMOL/L (ref 94–109)
CO2 SERPL-SCNC: 26 MMOL/L (ref 20–32)
CREAT SERPL-MCNC: 0.6 MG/DL (ref 0.52–1.04)
ERYTHROCYTE [DISTWIDTH] IN BLOOD BY AUTOMATED COUNT: 13.5 % (ref 10–15)
GFR SERPL CREATININE-BSD FRML MDRD: >90 ML/MIN/1.73M2
GLUCOSE BLD-MCNC: 106 MG/DL (ref 70–99)
HCT VFR BLD AUTO: 43 % (ref 35–47)
HGB BLD-MCNC: 13.9 G/DL (ref 11.7–15.7)
MCH RBC QN AUTO: 27.9 PG (ref 26.5–33)
MCHC RBC AUTO-ENTMCNC: 32.3 G/DL (ref 31.5–36.5)
MCV RBC AUTO: 86 FL (ref 78–100)
PLATELET # BLD AUTO: 598 10E3/UL (ref 150–450)
POTASSIUM BLD-SCNC: 3.7 MMOL/L (ref 3.4–5.3)
RBC # BLD AUTO: 4.99 10E6/UL (ref 3.8–5.2)
SODIUM SERPL-SCNC: 138 MMOL/L (ref 133–144)
WBC # BLD AUTO: 12.2 10E3/UL (ref 4–11)

## 2021-09-02 PROCEDURE — 80048 BASIC METABOLIC PNL TOTAL CA: CPT | Performed by: PATHOLOGY

## 2021-09-02 PROCEDURE — 36415 COLL VENOUS BLD VENIPUNCTURE: CPT | Performed by: PATHOLOGY

## 2021-09-02 PROCEDURE — 99204 OFFICE O/P NEW MOD 45 MIN: CPT | Mod: 95 | Performed by: CLINICAL NURSE SPECIALIST

## 2021-09-02 PROCEDURE — A9585 GADOBUTROL INJECTION: HCPCS | Performed by: RADIOLOGY

## 2021-09-02 PROCEDURE — 99214 OFFICE O/P EST MOD 30 MIN: CPT | Performed by: ORTHOPAEDIC SURGERY

## 2021-09-02 PROCEDURE — 85027 COMPLETE CBC AUTOMATED: CPT | Performed by: PATHOLOGY

## 2021-09-02 PROCEDURE — 73220 MRI UPPR EXTREMITY W/O&W/DYE: CPT | Mod: LT | Performed by: RADIOLOGY

## 2021-09-02 RX ORDER — GADOBUTROL 604.72 MG/ML
10 INJECTION INTRAVENOUS ONCE
Status: COMPLETED | OUTPATIENT
Start: 2021-09-02 | End: 2021-09-02

## 2021-09-02 RX ADMIN — GADOBUTROL 10 ML: 604.72 INJECTION INTRAVENOUS at 12:37

## 2021-09-02 ASSESSMENT — PAIN SCALES - GENERAL
PAINLEVEL: SEVERE PAIN (6)
PAINLEVEL: SEVERE PAIN (6)

## 2021-09-02 NOTE — NURSING NOTE
Teaching Flowsheet   Relevant Diagnosis: excision tumor bed left arm  Teaching Topic: preop     Person(s) involved in teaching:   Patient     Motivation Level:  Asks Questions: Yes  Eager to Learn: Yes  Cooperative: Yes  Receptive (willing/able to accept information): Yes  Any cultural factors/Protestant beliefs that may influence understanding or compliance? No       Patient demonstrates understanding of the following:  Reason for the appointment, diagnosis and treatment plan: Yes  Knowledge of proper use of medications and conditions for which they are ordered (with special attention to potential side effects or drug interactions): Yes  Which situations necessitate calling provider and whom to contact: Yes       Teaching Concerns Addressed:   {     Proper use and care of soap (medical equip, care aids, etc.): Yes  Nutritional needs and diet plan: Yes  Pain management techniques: Yes  Wound Care: Yes see discharge instruction for wound care  How and/when to access community resources: NA     Instructional Materials Used/Given: Surgery packet reviewed with patient by RN.  Her PAC H&P is at 1500 today.  She has no other questions.  Her boyfriend will take her home and care for her after surgery.  Time spent with patient

## 2021-09-02 NOTE — PATIENT INSTRUCTIONS
Jeff Diallo,    It was nice to see you today!  As we reviewed, I have you scheduled for a nurse only telephone call on Thursday, 9/9/2021 at 10:00 AM.  Please send a picture of your arm and skin changes through [employer] prior to this scheduled appointment.      Please feel free to contact our clinic with any questions or concerns following today's appointment.    Thank you!    Teodora MORRIS  [phone]

## 2021-09-02 NOTE — PATIENT INSTRUCTIONS
Preparing for Your Surgery      Name:  Imani Albert   MRN:  [id number]   :  1970   Today's Date:  2021       Arriving for surgery:  Surgery date:  To be called with information  Arrival time:  To be called with information    Restrictions due to COVID 19:       One visitor is allowed in the Pre Op area. When you go into surgery, one visitor is allowed to wait in the Surgery Waiting Room       (provided there is enough space to social distance).   After surgery- Two visitors are allowed at a time if you have a private room and one visitor is allowed for those in a semi-private room.   Every 4 days the visitor(s) can rotate. During the 4 day period, the visitor(s) must be consistent. No visitors under the age of 18 years old.   Visiting Hours: 8 am - 8:30 pm   No ill visitors.   All visitors must wear face mask.    [employer] parking is available for anyone with mobility limitations or disabilities.  ([place]  24 hours/ 7 days a week; [facility]  7 am- 3:30 pm, Mon- Fri)    Please come to:     To be called with information    What can I eat or drink?  -  You may eat and drink normally for up to 8 hours before your surgery.   -  You may have clear liquids until 2 hours before surgery.     Examples of clear liquids:  Water  Clear broth  Juices (apple, white grape, white cranberry  and cider) without pulp  Noncarbonated, powder based beverages  (lemonade and Kike-Aid)  Sodas (Sprite, 7-Up, ginger ale and seltzer)  Coffee or tea (without milk or cream)  Gatorade    -  No Alcohol for at least 24 hours before surgery     Which medicines can I take?    Hold Aspirin (Excedrin migraine) for 7 days before surgery.   Hold Multivitamins for 7 days before surgery.  Hold Supplements for 7 days before surgery.  Hold Ibuprofen (Advil, Motrin) for 1 day before surgery--unless otherwise directed by surgeon.  Hold Naproxen (Aleve) for 4 days before surgery.  HOLD MAXALT X 24 HOURS BEFORE SURGERY.  -  PLEASE TAKE these  medications the day of surgery:  Tylenol or oxycodone if needed; take other morning medication.    How do I prepare myself?  - Please take 2 showers before surgery using Scrubcare or Hibiclens soap.    Use this soap only from the neck to your toes.     Leave the soap on your skin for one minute--then rinse thoroughly.      You may use your own shampoo and conditioner; no other hair products.   - Please remove all jewelry and body piercings.  - No lotions, deodorants or fragrance.  - No makeup or fingernail polish.   - Bring your ID and insurance card.    -If you have a Deep Brain Stimulator, Spinal Cord Stimulator or any neuro stimulator device---you must bring the remote control to the hospital     - All patients are required to have a Covid-19 test within 4 days of surgery/procedure.      -Patients will be contacted by the [facility] scheduling team within 1 week of surgery to make an appointment.      - Patients may call the Scheduling team at [phone] if they have not been scheduled within 4 days of  surgery.      ALL PATIENTS GOING HOME THE SAME DAY OF SURGERY ARE REQUIRED TO HAVE A RESPONSIBLE ADULT TO DRIVE AND BE IN ATTENDANCE WITH THEM FOR 24 HOURS FOLLOWING SURGERY.      Questions or Concerns:    - For any questions regarding the day of surgery or your hospital stay, please contact the Pre Admission Nursing Office at [phone].       - If you have health changes between today and your surgery please call your surgeon.       For questions after surgery please call your surgeons office.

## 2021-09-02 NOTE — LETTER
9/2/2021         RE: Imani Albert  [address]  [address]  [facility] [address]        Dear Colleague,    Thank you for referring your patient, Imani Albert, to the [facility] [place]. Please see a copy of my visit note below.    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  Preoperative radiation completed.  Tumor bed excision planned.    Imani was seen having now completed her radiation for approximately 2-1/2 weeks.  She had some concern about the discomfort and redness around her radiation site.  I reassured her that I believe this will continue to improve over the weeks to come.    On examination she does show the obvious and expected effects of radiation.  Her incision is visible and measures approximately 1-1/4 inch.    MRI scan was reviewed in detail and shows no residual solid tumor.    I reviewed with her our recommendation for tumor bed excision.  She understands and would like to proceed.  I did discuss in detail the risk particularly the risk of potentially a positive surgical margin and the high frequency of wound complications.  She understands this and would like to proceed.    Impression: Plan tumor bed excision left arm.    Plan: 1.  Case request form completed.  Patient on the surgery schedule for next Friday.    Total visit was 20 minutes including reviewing her most recent MRI scan and counseling her regarding surgery.    Abdirahman Rae MD

## 2021-09-02 NOTE — NURSING NOTE
"Patient presents to clinic for nurse only skin assessment, patient with dry desquamation, areas of scabbing of left posterior arm.  Patient rates pain \"6/10\", reports taking Percocet po one tablet at night for the past two nights, reports she is sleeping well, however is waking up with a headache in the morning.  Patient reports she is also taking Tylenol 1,000 mg po every six hours.  Reviewed max dosing of Tylenol of 4,000 mg po and is over max dosing with current regimen.  Reviewed with patient taking Tylenol 1,000 mg po three times daily and Percocet po as needed at bedtime.  Patient reports she continues soaking arm in bathtub with luke warm water for comfort, denies use of soap as previously reviewed.  Patient reports she continues with use of cool packs during the day.  Patient reports she stopped using Aquaphor as she feels this burns for her and causes additional pain.  Patient does report she picked up prescription for Silvrstat and is using two times daily.  Patient believes her most recent bowel movement was yesterday, 9/1/2021 and reports she has not been taking Senna-S po as previously recommended.  Again reviewed constipation side effect with Percocet and recommend patient start taking Senna-S po one tablet two times daily.  Patient shares at this visit \"one thing I haven't told you yet is that at night, I am using an adhesive surgical dressing over the radiation burn area and then wrapping in an ace bandage for comfort\".  Patient reports in the morning, she \"peels the dressing off and skin comes with it and it starts to bleed\".  Advised patient to stop using adhesive surgical pad at this time.  In place, patient was provided with non-stick Telfa pads to use at night for comfort with ace bandage.  This RN did request physician presence for evaluation of skin changes from radiation, Dr. Clay met with patient and agreed with nursing evaluation and recommendations which were reviewed with patient.  " Reviewed nurse only telephone call in one week, scheduled 9/9/2021 at 1000 and patient confirms she will send picture via [employer] prior to that visit.  Patient verbalized understanding of all information and had no questions at this time.    Teodora Camilo, RN BSN OCN CBCN

## 2021-09-02 NOTE — NURSING NOTE
Reason For Visit:   Chief Complaint   Patient presents with     RECHECK     follow up to review imaging and discuss surgery.        There were no vitals taken for this visit.         Syed Magallon ATC

## 2021-09-02 NOTE — H&P
Pre-Operative H & P     CC:  Preoperative exam to assess for increased cardiopulmonary risk while undergoing surgery and anesthesia.    Date of Encounter: 9/2/2021  Primary Care Physician:  Lillian Bui     Reason for visit:   Encounter Diagnoses   Name Primary?     Preop examination Yes     Sarcoma (H)      HPI  Imani Albert is a 51 year old female who presents for pre-operative H & P in preparation for excision of tumor bed left arm with Dr. Rae on 9/10/21 at [facility]. History is obtained from the patient and chart review.    Patient who has been recently followed by Dr. 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 She was referred for radiation, now complete. She returned today for repeat MRI and consult with Dr. Rae for surgical planning. She was having some pain and skin changes at the radiation site. She was counseled for above procedure.    Her history is otherwise significant for obesity, LEROY, reactive airway disease, former smoking, DM II, stress incontinence, migraines, and depression. Today patient denies fever, cough, shortness of breath, chest pain, irregular HR, or ankle edema. She is able to walk 10K when feeling well.       Hx of abnormal bleeding or anti-platelet use: Denies.     Menstrual history: Patient's last menstrual period was 08/17/2021 (approximate).    Prior to Admission Medications  Current Outpatient Medications   Medication Sig Dispense Refill     acetaminophen (TYLENOL) 500 MG tablet Take 500-1,000 mg by mouth every 6 hours as needed for mild pain        ibuprofen (ADVIL/MOTRIN) 200 MG tablet Take 200 mg by mouth every 4 hours as needed for mild pain        oxyCODONE-acetaminophen (PERCOCET) 5-325 MG tablet Take 1 tablet by mouth every 6 hours as needed for severe pain (Patient taking differently: Take 1 tablet by mouth every 6 hours as needed for severe pain Bed time) 15 tablet 0     Silver (SILVRSTAT  WOUND DRESSING) GEL Externally apply 1 capful topically 2 times daily as needed 28.35 g 1     amitriptyline (ELAVIL) 50 MG tablet Take 50 mg by mouth (Patient not taking: Reported on 9/2/2021)       cetirizine (ZYRTEC) 10 MG tablet Take 10 mg by mouth (Patient not taking: Reported on 9/2/2021)       rizatriptan (MAXALT) 5 MG tablet TAKE ONE TABLET BY MOUTH EVERY 2 HOURS AS NEEDED FOR MIGRAINE. TAKE AT LEAST 2 HOURS APART. MAX 6 TABLETS PER DAY (Patient not taking: No sig reported)       topiramate (TOPAMAX) 25 MG tablet Take 25 mg by mouth (Patient not taking: Reported on 9/2/2021)       UNABLE TO FIND daily MEDICATION NAME: Truvy Supplements - True Control and True Fix         Family History  Family History   Problem Relation Age of Onset     Lung Cancer Father      Factor V Leiden deficiency Father         hx of blood clot     Diabetes Father      Thyroid Disease Sister      ALS Maternal Grandmother      Heart Disease Maternal Grandfather          Past Medical History:   Diagnosis Date     Attention deficit hyperactivity disorder (ADHD), predominantly inattentive type      Depression      Diabetes mellitus, type 2 (H)      Migraines      Obesity      LEROY (obstructive sleep apnea)      Reactive airway disease      S/P radiation therapy     5,000 cGy to left arm completed on 8/13/2021 - [facility]     Sarcoma (H) 05/26/2021    high grade pleomorphic sarcoma      Past Surgical History:   Procedure Laterality Date     COLPOSCOPY       CYSTOSCOPY       LAPAROSCOPIC CHOLECYSTECTOMY       SPLENECTOMY  2007     SURGICAL PATHOLOGY EXAM Left 05/26/2021    Skin and Soft Tissue Excision of Left Upper Arm - [facility]     TUBAL LIGATION        Allergies   Allergen Reactions     Justicia Adhatoda ([place] Nut Tree)  [Justicia Adhatoda]      Other reaction(s): Throat Swelling/Closing  All nuts that are packaged with shells     Rofecoxib      VIOXX              Other-Dizziness      Social History     Tobacco Use      Smoking status: Former Smoker     Packs/day: 0.50     Years: 25.00     Pack years: 12.50     Types: Cigarettes     Quit date: 2003     Years since quittin.6     Smokeless tobacco: Never Used   Substance Use Topics     Alcohol use: Not Currently      Wt Readings from Last 1 Encounters:   21 100.1 kg (220 lb 11.2 oz)        ROS/MED HISTORY  The complete review of systems is negative other than noted in the HPI or here.    ENT/Pulmonary: Comment: Typically pulls CPAP off at night  Past issues with asthma but none recently and no use of inhalers.     (+) sleep apnea, doesn't use CPAP, tobacco use, Past use,  (-) recent URI   Neurologic:     (+) migraines,     Cardiovascular:     (+) -----Previous cardiac testing   Echo: Date: Results:    Stress Test: Date: Results:    ECG Reviewed: Date:  Results:  ST, read as septal infarct  Cath: Date: Results:   (-) taking anticoagulants/antiplatelets   METS/Exercise Tolerance: >4 METS Comment: Walks 10K without difficulty   Hematologic:  - neg hematologic  ROS  (-) history of blood transfusion   Musculoskeletal: Comment: Pain at site of radiation.      GI/Hepatic:  - neg GI/hepatic ROS     Renal/Genitourinary:  - neg Renal ROS     Endo: Comment: No meds    (+) type II DM, Last HgA1c: 6.0 , Not using insulin, - not using insulin pump. Obesity,     Psychiatric/Substance Use:  - neg psychiatric ROS     Infectious Disease:       Malignancy:   (+) Malignancy, History of Other.Other CA sarcoma Active status post Radiation.    Other:  - neg other ROS            Virtual visit -  No vitals were obtained       Physical Exam  Constitutional: Awake, alert, no apparent distress, and appears stated age.  HENT: Normocephalic  Respiratory: non labored breathing   Neurologic: Awake, alert, oriented to name, place and time.   Neuropsychiatric: Calm, cooperative. Normal affect.       PRIOR LABS/DIAGNOSTIC STUDIES:   All labs and imaging personally reviewed   Labs:   Lab Results    Component Value Date    WBC 12.2 2021     Lab Results   Component Value Date    RBC 4.99 2021     Lab Results   Component Value Date    HGB 13.9 2021     Lab Results   Component Value Date    HCT 43.0 2021     Lab Results   Component Value Date    MCV 86 2021     Lab Results   Component Value Date    MCH 27.9 2021     Lab Results   Component Value Date    MCHC 32.3 2021     Lab Results   Component Value Date    RDW 13.5 2021     Lab Results   Component Value Date     2021     Last Comprehensive Metabolic Panel:  Sodium   Date Value Ref Range Status   2021 138 133 - 144 mmol/L Final     Potassium   Date Value Ref Range Status   2021 3.7 3.4 - 5.3 mmol/L Final     Chloride   Date Value Ref Range Status   2021 105 94 - 109 mmol/L Final     Carbon Dioxide (CO2)   Date Value Ref Range Status   2021 26 20 - 32 mmol/L Final     Anion Gap   Date Value Ref Range Status   2021 7 3 - 14 mmol/L Final     Glucose   Date Value Ref Range Status   2021 106 (H) 70 - 99 mg/dL Final     Urea Nitrogen   Date Value Ref Range Status   2021 13 7 - 30 mg/dL Final     Creatinine   Date Value Ref Range Status   2021 0.60 0.52 - 1.04 mg/dL Final     GFR Estimate   Date Value Ref Range Status   2021 >90 >60 mL/min/1.73m2 Final     Comment:     As of 2021, eGFR is calculated by the CKD-EPI creatinine equation, without race adjustment. eGFR can be influenced by muscle mass, exercise, and diet. The reported eGFR is an estimation only and is only applicable if the renal function is stable.     Calcium   Date Value Ref Range Status   2021 9.3 8.5 - 10.1 mg/dL Final     EK Sinus tachycardia, read as septal infarct  MRI humerus 21                                                Impression:     Compared to prior MRI 2021, there is decreased edema-like signal  and enhancement in the postsurgical region in  "this patient with a  history of high-grade pleomorphic liposarcoma. Findings likely  represent postsurgical change and residual tumor.    PET/CT 6/28/21                                                                   IMPRESSION: In this patient with a history of high-grade pleomorphic  liposarcoma of the left arm, status post excision:     1. No FDG avid primary metastatic tumor is identified.  2.  Prominent left inguinal and right axillary lymph nodes without  significant FDG avidity, favored to be reactive.  3. Subcentimeter pulmonary nodules as described above, recommend  continued attention on follow-up exams.    2013 Echo  Final Conclusion   Mildly increased  wall thickness. Normal left ventricular size and systolic function. Visually estimated ejection fraction is 60-65%. No obvious regional wall motion abnormalities. \"pseudonormal\" filling pattern of the left ventricle for age (stage 2 diastolic dysfunction). Normal right ventricular size and function. No significant valvular heart disease noted. Cannot estimate PA pressures on this study. Estimated EF: 60-65%      NM Cardiac stress test 2013  FINAL CONCLUSIONS   NORMAL pharmacologic stress perfusion imaging study. Myocardial perfusion imaging was normal. LV systolic function was normal without regional wall motion abnormalities. LVEF is calculated to be 70%.      The patient's records and results personally reviewed by this provider.     Outside records reviewed from: care everywhere      ASSESSMENT and PLAN  Imani Albert is a 51 year old female who presents for pre-operative H & P in preparation for excision of tumor bed left arm with Dr. Rae on 9/10/21 at [facility]. History is obtained from the patient and chart review.  RCRI: 0.4% risk of serious cardiac event  VTE: 4.5%  PONV: 3. If 3 or > anti emetic intervention recommended with two or more meds    --High grade pleomorphic liposarcoma of left arm s/p radiation. Above " procedure now planned. Oxycodone at HS.  --No history of problems with anesthesia.  --No cardiac history, symptoms or meds. Normal pharmacologic stress test in 2013 completed for chest pain and dyspnea at that time. Last EF 60%. Good exercise tolerance.   --Former smoker. History of reactive airway but no recent issues or use of inhalers. She attributes this to a supplement that she is taking. LEROY with CPAP but tends to pull it off at night.   --DIABETES MELLITUS II. Last A1C 6.0. Has been off metformin for over a year.   --Migraines, less frequent since she has finished radiation. Maxalt or Excedrin prn.   --Family history of blood clots and Factor V Leiden in her father.   --Denies history of blood transfusion.     NPO, shower and medication instructions provided by nursing staff today. She will receive further details when surgery is scheduled.   .    ** Patient's visit was done virtually today.  A full physical exam was not completed.  Please refer to the physical examination documented by the anesthesiologist in the anesthesia record on the day of surgery. **      The patient is optimized and an acceptable candidate for the proposed procedure. Arrival time, NPO, shower and medication instructions provided by nursing staff today.      Video-Visit Details    Type of service:  Video Visit    Patient verbally consented to video service today: YES      Video Start Time: 3:04pm  Video End Time (time video stopped): 3:20pm     Originating Location (pt. Location): Other clinic    Distant Location (provider location):  [facility] PREOPERATIVE ASSESSMENT [facility]ENTER     Mode of Communication:  Video Conference via HARRIET Valle CNS  Preoperative Assessment Center  [facility]  Clinic and Surgery Center  Phone: [phone]  Fax: [phone]

## 2021-09-02 NOTE — PROGRESS NOTES
Imani is a 51 year old who is being evaluated via a billable video visit.      How would you like to obtain your AVS? [email or web address]  If the video visit is dropped, the invitation should be resent by: Text to cell phone: [phone]    HPI         Review of Systems         Objective    Vitals - Patient Reported  Pain Score: Severe Pain (6)  Pain Loc: Arm (Left)        Physical Exam

## 2021-09-02 NOTE — H&P (VIEW-ONLY)
Pre-Operative H & P     CC:  Preoperative exam to assess for increased cardiopulmonary risk while undergoing surgery and anesthesia.    Date of Encounter: 9/2/2021  Primary Care Physician:  Lillian Bui     Reason for visit:   Encounter Diagnoses   Name Primary?     Preop examination Yes     Sarcoma (H)      HPI  Imani Albert is a 51 year old female who presents for pre-operative H & P in preparation for excision of tumor bed left arm with Dr. Rae on 9/10/21 at [facility]. History is obtained from the patient and chart review.    Patient who has been recently followed by Dr. 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 She was referred for radiation, now complete. She returned today for repeat MRI and consult with Dr. Rae for surgical planning. She was having some pain and skin changes at the radiation site. She was counseled for above procedure.    Her history is otherwise significant for obesity, LEROY, reactive airway disease, former smoking, DM II, stress incontinence, migraines, and depression. Today patient denies fever, cough, shortness of breath, chest pain, irregular HR, or ankle edema. She is able to walk 10K when feeling well.       Hx of abnormal bleeding or anti-platelet use: Denies.     Menstrual history: Patient's last menstrual period was 08/17/2021 (approximate).    Prior to Admission Medications  Current Outpatient Medications   Medication Sig Dispense Refill     acetaminophen (TYLENOL) 500 MG tablet Take 500-1,000 mg by mouth every 6 hours as needed for mild pain        ibuprofen (ADVIL/MOTRIN) 200 MG tablet Take 200 mg by mouth every 4 hours as needed for mild pain        oxyCODONE-acetaminophen (PERCOCET) 5-325 MG tablet Take 1 tablet by mouth every 6 hours as needed for severe pain (Patient taking differently: Take 1 tablet by mouth every 6 hours as needed for severe pain Bed time) 15 tablet 0     Silver (SILVRSTAT  WOUND DRESSING) GEL Externally apply 1 capful topically 2 times daily as needed 28.35 g 1     amitriptyline (ELAVIL) 50 MG tablet Take 50 mg by mouth (Patient not taking: Reported on 9/2/2021)       cetirizine (ZYRTEC) 10 MG tablet Take 10 mg by mouth (Patient not taking: Reported on 9/2/2021)       rizatriptan (MAXALT) 5 MG tablet TAKE ONE TABLET BY MOUTH EVERY 2 HOURS AS NEEDED FOR MIGRAINE. TAKE AT LEAST 2 HOURS APART. MAX 6 TABLETS PER DAY (Patient not taking: No sig reported)       topiramate (TOPAMAX) 25 MG tablet Take 25 mg by mouth (Patient not taking: Reported on 9/2/2021)       UNABLE TO FIND daily MEDICATION NAME: Truvy Supplements - True Control and True Fix         Family History  Family History   Problem Relation Age of Onset     Lung Cancer Father      Factor V Leiden deficiency Father         hx of blood clot     Diabetes Father      Thyroid Disease Sister      ALS Maternal Grandmother      Heart Disease Maternal Grandfather          Past Medical History:   Diagnosis Date     Attention deficit hyperactivity disorder (ADHD), predominantly inattentive type      Depression      Diabetes mellitus, type 2 (H)      Migraines      Obesity      LEROY (obstructive sleep apnea)      Reactive airway disease      S/P radiation therapy     5,000 cGy to left arm completed on 8/13/2021 - [facility]     Sarcoma (H) 05/26/2021    high grade pleomorphic sarcoma      Past Surgical History:   Procedure Laterality Date     COLPOSCOPY       CYSTOSCOPY       LAPAROSCOPIC CHOLECYSTECTOMY       SPLENECTOMY  2007     SURGICAL PATHOLOGY EXAM Left 05/26/2021    Skin and Soft Tissue Excision of Left Upper Arm - [facility]     TUBAL LIGATION        Allergies   Allergen Reactions     Justicia Adhatoda ([place] Nut Tree)  [Justicia Adhatoda]      Other reaction(s): Throat Swelling/Closing  All nuts that are packaged with shells     Rofecoxib      VIOXX              Other-Dizziness      Social History     Tobacco Use      Smoking status: Former Smoker     Packs/day: 0.50     Years: 25.00     Pack years: 12.50     Types: Cigarettes     Quit date: 2003     Years since quittin.6     Smokeless tobacco: Never Used   Substance Use Topics     Alcohol use: Not Currently      Wt Readings from Last 1 Encounters:   21 100.1 kg (220 lb 11.2 oz)        ROS/MED HISTORY  The complete review of systems is negative other than noted in the HPI or here.    ENT/Pulmonary: Comment: Typically pulls CPAP off at night  Past issues with asthma but none recently and no use of inhalers.     (+) sleep apnea, doesn't use CPAP, tobacco use, Past use,  (-) recent URI   Neurologic:     (+) migraines,     Cardiovascular:     (+) -----Previous cardiac testing   Echo: Date: Results:    Stress Test: Date: Results:    ECG Reviewed: Date:  Results:  ST, read as septal infarct  Cath: Date: Results:   (-) taking anticoagulants/antiplatelets   METS/Exercise Tolerance: >4 METS Comment: Walks 10K without difficulty   Hematologic:  - neg hematologic  ROS  (-) history of blood transfusion   Musculoskeletal: Comment: Pain at site of radiation.      GI/Hepatic:  - neg GI/hepatic ROS     Renal/Genitourinary:  - neg Renal ROS     Endo: Comment: No meds    (+) type II DM, Last HgA1c: 6.0 , Not using insulin, - not using insulin pump. Obesity,     Psychiatric/Substance Use:  - neg psychiatric ROS     Infectious Disease:       Malignancy:   (+) Malignancy, History of Other.Other CA sarcoma Active status post Radiation.    Other:  - neg other ROS            Virtual visit -  No vitals were obtained       Physical Exam  Constitutional: Awake, alert, no apparent distress, and appears stated age.  HENT: Normocephalic  Respiratory: non labored breathing   Neurologic: Awake, alert, oriented to name, place and time.   Neuropsychiatric: Calm, cooperative. Normal affect.       PRIOR LABS/DIAGNOSTIC STUDIES:   All labs and imaging personally reviewed   Labs:   Lab Results    Component Value Date    WBC 12.2 2021     Lab Results   Component Value Date    RBC 4.99 2021     Lab Results   Component Value Date    HGB 13.9 2021     Lab Results   Component Value Date    HCT 43.0 2021     Lab Results   Component Value Date    MCV 86 2021     Lab Results   Component Value Date    MCH 27.9 2021     Lab Results   Component Value Date    MCHC 32.3 2021     Lab Results   Component Value Date    RDW 13.5 2021     Lab Results   Component Value Date     2021     Last Comprehensive Metabolic Panel:  Sodium   Date Value Ref Range Status   2021 138 133 - 144 mmol/L Final     Potassium   Date Value Ref Range Status   2021 3.7 3.4 - 5.3 mmol/L Final     Chloride   Date Value Ref Range Status   2021 105 94 - 109 mmol/L Final     Carbon Dioxide (CO2)   Date Value Ref Range Status   2021 26 20 - 32 mmol/L Final     Anion Gap   Date Value Ref Range Status   2021 7 3 - 14 mmol/L Final     Glucose   Date Value Ref Range Status   2021 106 (H) 70 - 99 mg/dL Final     Urea Nitrogen   Date Value Ref Range Status   2021 13 7 - 30 mg/dL Final     Creatinine   Date Value Ref Range Status   2021 0.60 0.52 - 1.04 mg/dL Final     GFR Estimate   Date Value Ref Range Status   2021 >90 >60 mL/min/1.73m2 Final     Comment:     As of 2021, eGFR is calculated by the CKD-EPI creatinine equation, without race adjustment. eGFR can be influenced by muscle mass, exercise, and diet. The reported eGFR is an estimation only and is only applicable if the renal function is stable.     Calcium   Date Value Ref Range Status   2021 9.3 8.5 - 10.1 mg/dL Final     EK Sinus tachycardia, read as septal infarct  MRI humerus 21                                                Impression:     Compared to prior MRI 2021, there is decreased edema-like signal  and enhancement in the postsurgical region in  "this patient with a  history of high-grade pleomorphic liposarcoma. Findings likely  represent postsurgical change and residual tumor.    PET/CT 6/28/21                                                                   IMPRESSION: In this patient with a history of high-grade pleomorphic  liposarcoma of the left arm, status post excision:     1. No FDG avid primary metastatic tumor is identified.  2.  Prominent left inguinal and right axillary lymph nodes without  significant FDG avidity, favored to be reactive.  3. Subcentimeter pulmonary nodules as described above, recommend  continued attention on follow-up exams.    2013 Echo  Final Conclusion   Mildly increased  wall thickness. Normal left ventricular size and systolic function. Visually estimated ejection fraction is 60-65%. No obvious regional wall motion abnormalities. \"pseudonormal\" filling pattern of the left ventricle for age (stage 2 diastolic dysfunction). Normal right ventricular size and function. No significant valvular heart disease noted. Cannot estimate PA pressures on this study. Estimated EF: 60-65%      NM Cardiac stress test 2013  FINAL CONCLUSIONS   NORMAL pharmacologic stress perfusion imaging study. Myocardial perfusion imaging was normal. LV systolic function was normal without regional wall motion abnormalities. LVEF is calculated to be 70%.      The patient's records and results personally reviewed by this provider.     Outside records reviewed from: care everywhere      ASSESSMENT and PLAN  Imani Albert is a 51 year old female who presents for pre-operative H & P in preparation for excision of tumor bed left arm with Dr. Rae on 9/10/21 at [facility]. History is obtained from the patient and chart review.  RCRI: 0.4% risk of serious cardiac event  VTE: 4.5%  PONV: 3. If 3 or > anti emetic intervention recommended with two or more meds    --High grade pleomorphic liposarcoma of left arm s/p radiation. Above " procedure now planned. Oxycodone at HS.  --No history of problems with anesthesia.  --No cardiac history, symptoms or meds. Normal pharmacologic stress test in 2013 completed for chest pain and dyspnea at that time. Last EF 60%. Good exercise tolerance.   --Former smoker. History of reactive airway but no recent issues or use of inhalers. She attributes this to a supplement that she is taking. LEROY with CPAP but tends to pull it off at night.   --DIABETES MELLITUS II. Last A1C 6.0. Has been off metformin for over a year.   --Migraines, less frequent since she has finished radiation. Maxalt or Excedrin prn.   --Family history of blood clots and Factor V Leiden in her father.   --Denies history of blood transfusion.     NPO, shower and medication instructions provided by nursing staff today. She will receive further details when surgery is scheduled.   .    ** Patient's visit was done virtually today.  A full physical exam was not completed.  Please refer to the physical examination documented by the anesthesiologist in the anesthesia record on the day of surgery. **      The patient is optimized and an acceptable candidate for the proposed procedure. Arrival time, NPO, shower and medication instructions provided by nursing staff today.      Video-Visit Details    Type of service:  Video Visit    Patient verbally consented to video service today: YES      Video Start Time: 3:04pm  Video End Time (time video stopped): 3:20pm     Originating Location (pt. Location): Other clinic    Distant Location (provider location):  [facility] PREOPERATIVE ASSESSMENT CENTER     Mode of Communication:  Video Conference via HARRIET Valle CNS  Preoperative Assessment Center  [facility]  Clinic and Surgery Center  Phone: [phone]  Fax: [phone]

## 2021-09-02 NOTE — PROGRESS NOTES
Orthopedic Surgery Clinic Progress Note    Summary: ***-year-old ***HD fe***male status post *** with  ***.     Subjective: ***    Objective:  General: Well-appearing, no apparent distress  HEENT: no trauma   Cardiovascular: Extremities warm  Pulmonary nonlabored breathing on room air  Musculoskeletal  ***    Imaging  X-ray ***    Assessment: ***    Plan:  Reviewed clinical findings and imaging with patient today in clinic.  Discussed *** All questions and concerns were answered today.     Patient was seen and plan discussed with  ***.    Charlee Morales MD  Orthopaedic Surgery PGY4

## 2021-09-03 NOTE — PROGRESS NOTES
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  Preoperative radiation completed.  Tumor bed excision planned.    Imani was seen having now completed her radiation for approximately 2-1/2 weeks.  She had some concern about the discomfort and redness around her radiation site.  I reassured her that I believe this will continue to improve over the weeks to come.    On examination she does show the obvious and expected effects of radiation.  Her incision is visible and measures approximately 1-1/4 inch.    MRI scan was reviewed in detail and shows no residual solid tumor.    I reviewed with her our recommendation for tumor bed excision.  She understands and would like to proceed.  I did discuss in detail the risk particularly the risk of potentially a positive surgical margin and the high frequency of wound complications.  She understands this and would like to proceed.    Impression: Plan tumor bed excision left arm.    Plan: 1.  Case request form completed.  Patient on the surgery schedule for next Friday.    Total visit was 20 minutes including reviewing her most recent MRI scan and counseling her regarding surgery.

## 2021-09-07 ENCOUNTER — TELEPHONE (OUTPATIENT)
Dept: ORTHOPEDICS | Facility: CLINIC | Age: 51
End: 2021-09-07

## 2021-09-07 ENCOUNTER — PREP FOR PROCEDURE (OUTPATIENT)
Dept: ORTHOPEDICS | Facility: CLINIC | Age: 51
End: 2021-09-07

## 2021-09-07 DIAGNOSIS — C49.9 SARCOMA (H): Primary | ICD-10-CM

## 2021-09-07 DIAGNOSIS — Z11.59 ENCOUNTER FOR SCREENING FOR OTHER VIRAL DISEASES: ICD-10-CM

## 2021-09-07 NOTE — TELEPHONE ENCOUNTER
RN called and told Imani of her  covid test tomorrow at 1115 at the [facility] location. She will attend.

## 2021-09-07 NOTE — TELEPHONE ENCOUNTER
Patient is scheduled for surgery with Dr. Rae    Spoke with: ARTURO Boogie - scheduled with patient    Date of Surgery: 9/10/2021    Location: [facility]    Informed patient they will need an adult  yes    Pre op with Provider n/a    H&P: Completed with PAC    Pre-procedure COVID-19 Test: will be done locally    Additional imaging/appointments: n/a    Surgery packet: given in clinic     Additional comments: n/a

## 2021-09-08 ENCOUNTER — LAB (OUTPATIENT)
Dept: LAB | Facility: CLINIC | Age: 51
End: 2021-09-08
Payer: COMMERCIAL

## 2021-09-08 DIAGNOSIS — Z11.59 ENCOUNTER FOR SCREENING FOR OTHER VIRAL DISEASES: ICD-10-CM

## 2021-09-08 PROCEDURE — U0005 INFEC AGEN DETEC AMPLI PROBE: HCPCS

## 2021-09-08 PROCEDURE — U0003 INFECTIOUS AGENT DETECTION BY NUCLEIC ACID (DNA OR RNA); SEVERE ACUTE RESPIRATORY SYNDROME CORONAVIRUS 2 (SARS-COV-2) (CORONAVIRUS DISEASE [COVID-19]), AMPLIFIED PROBE TECHNIQUE, MAKING USE OF HIGH THROUGHPUT TECHNOLOGIES AS DESCRIBED BY CMS-2020-01-R: HCPCS

## 2021-09-09 ENCOUNTER — ALLIED HEALTH/NURSE VISIT (OUTPATIENT)
Dept: RADIATION ONCOLOGY | Facility: CLINIC | Age: 51
End: 2021-09-09
Payer: COMMERCIAL

## 2021-09-09 ENCOUNTER — ANESTHESIA EVENT (OUTPATIENT)
Dept: SURGERY | Facility: AMBULATORY SURGERY CENTER | Age: 51
End: 2021-09-09
Payer: COMMERCIAL

## 2021-09-09 DIAGNOSIS — L58.9 RADIATION DERMATITIS: Primary | ICD-10-CM

## 2021-09-09 LAB — SARS-COV-2 RNA RESP QL NAA+PROBE: NEGATIVE

## 2021-09-09 ASSESSMENT — PAIN SCALES - GENERAL: PAINLEVEL: NO PAIN (0)

## 2021-09-09 NOTE — PATIENT INSTRUCTIONS
Please contact [facility] RN with questions or concerns following today's appointment: [phone].    Thank you!

## 2021-09-09 NOTE — NURSING NOTE
Telephone call to patient to follow-up on how patient is feeling and skin check.  Patient sent picture via MyChart, dry desquamation continues to heal and faint erythema, improved compared to in-clinic visit last week.  Patient reports pain much improved, reports she did not need to take Percocet po yesterday or last evening, reports only taking Tylenol at bedtime last evening.  Patient continues using Silvrstat, reviewed last application today as patient is scheduled for surgery with Dr. Rae tomorrow, 9/10/2021.  Patient verbalized understanding that future skin cares will come from Dr. Rae.   Patient had no questions or concerns at this time, encouraged to call clinic if needed.    Tedoora Camilo, RN BSN OCN CBCN

## 2021-09-10 ENCOUNTER — HOSPITAL ENCOUNTER (OUTPATIENT)
Facility: AMBULATORY SURGERY CENTER | Age: 51
End: 2021-09-10
Attending: ORTHOPAEDIC SURGERY
Payer: COMMERCIAL

## 2021-09-10 ENCOUNTER — ANESTHESIA (OUTPATIENT)
Dept: SURGERY | Facility: AMBULATORY SURGERY CENTER | Age: 51
End: 2021-09-10
Payer: COMMERCIAL

## 2021-09-10 VITALS
BODY MASS INDEX: 40.48 KG/M2 | WEIGHT: 220 LBS | RESPIRATION RATE: 14 BRPM | SYSTOLIC BLOOD PRESSURE: 134 MMHG | TEMPERATURE: 97.7 F | DIASTOLIC BLOOD PRESSURE: 89 MMHG | HEIGHT: 62 IN | OXYGEN SATURATION: 97 % | HEART RATE: 72 BPM

## 2021-09-10 DIAGNOSIS — C49.9 SARCOMA (H): ICD-10-CM

## 2021-09-10 LAB
HCG UR QL: NEGATIVE
INTERNAL QC OK POCT: NORMAL

## 2021-09-10 PROCEDURE — 23071 EXC SHOULDER LES SC 3 CM/>: CPT | Mod: LT | Performed by: ORTHOPAEDIC SURGERY

## 2021-09-10 PROCEDURE — 88309 TISSUE EXAM BY PATHOLOGIST: CPT | Mod: TC | Performed by: ORTHOPAEDIC SURGERY

## 2021-09-10 PROCEDURE — 81025 URINE PREGNANCY TEST: CPT | Performed by: PATHOLOGY

## 2021-09-10 PROCEDURE — 25071 EXC FOREARM LES SC 3 CM/>: CPT | Mod: RT

## 2021-09-10 RX ORDER — ACETAMINOPHEN 325 MG/1
975 TABLET ORAL ONCE
Status: DISCONTINUED | OUTPATIENT
Start: 2021-09-10 | End: 2021-09-10 | Stop reason: HOSPADM

## 2021-09-10 RX ORDER — LIDOCAINE HYDROCHLORIDE 20 MG/ML
INJECTION, SOLUTION INFILTRATION; PERINEURAL PRN
Status: DISCONTINUED | OUTPATIENT
Start: 2021-09-10 | End: 2021-09-10

## 2021-09-10 RX ORDER — ONDANSETRON 2 MG/ML
INJECTION INTRAMUSCULAR; INTRAVENOUS PRN
Status: DISCONTINUED | OUTPATIENT
Start: 2021-09-10 | End: 2021-09-10

## 2021-09-10 RX ORDER — GABAPENTIN 300 MG/1
300 CAPSULE ORAL
Status: DISCONTINUED | OUTPATIENT
Start: 2021-09-10 | End: 2021-09-10 | Stop reason: HOSPADM

## 2021-09-10 RX ORDER — OXYCODONE HYDROCHLORIDE 5 MG/1
5 TABLET ORAL EVERY 4 HOURS PRN
Qty: 16 TABLET | Refills: 0 | Status: SHIPPED | OUTPATIENT
Start: 2021-09-10 | End: 2024-02-27

## 2021-09-10 RX ORDER — SODIUM CHLORIDE, SODIUM LACTATE, POTASSIUM CHLORIDE, CALCIUM CHLORIDE 600; 310; 30; 20 MG/100ML; MG/100ML; MG/100ML; MG/100ML
INJECTION, SOLUTION INTRAVENOUS CONTINUOUS
Status: DISCONTINUED | OUTPATIENT
Start: 2021-09-10 | End: 2021-09-11 | Stop reason: HOSPADM

## 2021-09-10 RX ORDER — LIDOCAINE 40 MG/G
CREAM TOPICAL
Status: DISCONTINUED | OUTPATIENT
Start: 2021-09-10 | End: 2021-09-10 | Stop reason: HOSPADM

## 2021-09-10 RX ORDER — BUPIVACAINE HYDROCHLORIDE AND EPINEPHRINE 2.5; 5 MG/ML; UG/ML
INJECTION, SOLUTION INFILTRATION; PERINEURAL PRN
Status: DISCONTINUED | OUTPATIENT
Start: 2021-09-10 | End: 2021-09-10 | Stop reason: HOSPADM

## 2021-09-10 RX ORDER — FENTANYL CITRATE 50 UG/ML
25 INJECTION, SOLUTION INTRAMUSCULAR; INTRAVENOUS EVERY 5 MIN PRN
Status: DISCONTINUED | OUTPATIENT
Start: 2021-09-10 | End: 2021-09-11 | Stop reason: HOSPADM

## 2021-09-10 RX ORDER — OXYCODONE HYDROCHLORIDE 5 MG/1
5 TABLET ORAL EVERY 4 HOURS PRN
Status: DISCONTINUED | OUTPATIENT
Start: 2021-09-10 | End: 2021-09-11 | Stop reason: HOSPADM

## 2021-09-10 RX ORDER — CEFAZOLIN SODIUM 2 G/50ML
2 SOLUTION INTRAVENOUS SEE ADMIN INSTRUCTIONS
Status: DISCONTINUED | OUTPATIENT
Start: 2021-09-10 | End: 2021-09-10 | Stop reason: HOSPADM

## 2021-09-10 RX ORDER — PROPOFOL 10 MG/ML
INJECTION, EMULSION INTRAVENOUS PRN
Status: DISCONTINUED | OUTPATIENT
Start: 2021-09-10 | End: 2021-09-10

## 2021-09-10 RX ORDER — ONDANSETRON 4 MG/1
4 TABLET, ORALLY DISINTEGRATING ORAL EVERY 30 MIN PRN
Status: DISCONTINUED | OUTPATIENT
Start: 2021-09-10 | End: 2021-09-11 | Stop reason: HOSPADM

## 2021-09-10 RX ORDER — FENTANYL CITRATE 50 UG/ML
INJECTION, SOLUTION INTRAMUSCULAR; INTRAVENOUS PRN
Status: DISCONTINUED | OUTPATIENT
Start: 2021-09-10 | End: 2021-09-10

## 2021-09-10 RX ORDER — MEPERIDINE HYDROCHLORIDE 25 MG/ML
12.5 INJECTION INTRAMUSCULAR; INTRAVENOUS; SUBCUTANEOUS
Status: DISCONTINUED | OUTPATIENT
Start: 2021-09-10 | End: 2021-09-11 | Stop reason: HOSPADM

## 2021-09-10 RX ORDER — OXYCODONE HYDROCHLORIDE 5 MG/1
5 TABLET ORAL
Status: COMPLETED | OUTPATIENT
Start: 2021-09-10 | End: 2021-09-10

## 2021-09-10 RX ORDER — DEXAMETHASONE SODIUM PHOSPHATE 4 MG/ML
INJECTION, SOLUTION INTRA-ARTICULAR; INTRALESIONAL; INTRAMUSCULAR; INTRAVENOUS; SOFT TISSUE PRN
Status: DISCONTINUED | OUTPATIENT
Start: 2021-09-10 | End: 2021-09-10

## 2021-09-10 RX ORDER — ONDANSETRON 2 MG/ML
4 INJECTION INTRAMUSCULAR; INTRAVENOUS EVERY 30 MIN PRN
Status: DISCONTINUED | OUTPATIENT
Start: 2021-09-10 | End: 2021-09-11 | Stop reason: HOSPADM

## 2021-09-10 RX ORDER — SODIUM CHLORIDE, SODIUM LACTATE, POTASSIUM CHLORIDE, CALCIUM CHLORIDE 600; 310; 30; 20 MG/100ML; MG/100ML; MG/100ML; MG/100ML
INJECTION, SOLUTION INTRAVENOUS CONTINUOUS
Status: DISCONTINUED | OUTPATIENT
Start: 2021-09-10 | End: 2021-09-10 | Stop reason: HOSPADM

## 2021-09-10 RX ORDER — PROPOFOL 10 MG/ML
INJECTION, EMULSION INTRAVENOUS CONTINUOUS PRN
Status: DISCONTINUED | OUTPATIENT
Start: 2021-09-10 | End: 2021-09-10

## 2021-09-10 RX ORDER — CEFAZOLIN SODIUM 2 G/50ML
2 SOLUTION INTRAVENOUS
Status: DISCONTINUED | OUTPATIENT
Start: 2021-09-10 | End: 2021-09-10 | Stop reason: HOSPADM

## 2021-09-10 RX ORDER — ACETAMINOPHEN 325 MG/1
650 TABLET ORAL
Status: DISCONTINUED | OUTPATIENT
Start: 2021-09-10 | End: 2021-09-11 | Stop reason: HOSPADM

## 2021-09-10 RX ADMIN — OXYCODONE HYDROCHLORIDE 5 MG: 5 TABLET ORAL at 13:10

## 2021-09-10 RX ADMIN — FENTANYL CITRATE 100 MCG: 50 INJECTION, SOLUTION INTRAMUSCULAR; INTRAVENOUS at 12:08

## 2021-09-10 RX ADMIN — ONDANSETRON 4 MG: 2 INJECTION INTRAMUSCULAR; INTRAVENOUS at 12:15

## 2021-09-10 RX ADMIN — PROPOFOL 200 MG: 10 INJECTION, EMULSION INTRAVENOUS at 11:51

## 2021-09-10 RX ADMIN — PROPOFOL 150 MCG/KG/MIN: 10 INJECTION, EMULSION INTRAVENOUS at 11:51

## 2021-09-10 RX ADMIN — SODIUM CHLORIDE, SODIUM LACTATE, POTASSIUM CHLORIDE, CALCIUM CHLORIDE: 600; 310; 30; 20 INJECTION, SOLUTION INTRAVENOUS at 11:42

## 2021-09-10 RX ADMIN — DEXAMETHASONE SODIUM PHOSPHATE 4 MG: 4 INJECTION, SOLUTION INTRA-ARTICULAR; INTRALESIONAL; INTRAMUSCULAR; INTRAVENOUS; SOFT TISSUE at 12:15

## 2021-09-10 RX ADMIN — LIDOCAINE HYDROCHLORIDE 80 MG: 20 INJECTION, SOLUTION INFILTRATION; PERINEURAL at 11:51

## 2021-09-10 ASSESSMENT — MIFFLIN-ST. JEOR: SCORE: 1571.16

## 2021-09-10 ASSESSMENT — LIFESTYLE VARIABLES: TOBACCO_USE: 1

## 2021-09-10 NOTE — ANESTHESIA POSTPROCEDURE EVALUATION
Patient: Imani Albert    Procedure(s):  removal tumor left arm    Diagnosis:Sarcoma (H) [C49.9]  Diagnosis Additional Information: No value filed.    Anesthesia Type:  General    Note:  Disposition: Outpatient   Postop Pain Control: Uneventful            Sign Out: Well controlled pain   PONV: No   Neuro/Psych: Uneventful            Sign Out: Acceptable/Baseline neuro status   Airway/Respiratory: Uneventful            Sign Out: Acceptable/Baseline resp. status   CV/Hemodynamics: Uneventful            Sign Out: Acceptable CV status; No obvious hypovolemia; No obvious fluid overload   Other NRE: NONE   DID A NON-ROUTINE EVENT OCCUR? No           Last vitals:  Vitals Value Taken Time   /101 09/10/21 1321   Temp 36.1  C (97  F) 09/10/21 1321   Pulse 69 09/10/21 1323   Resp 15 09/10/21 1323   SpO2 96 % 09/10/21 1322   Vitals shown include unvalidated device data.    Electronically Signed By: Franko Archer MD  September 10, 2021  1:43 PM

## 2021-09-10 NOTE — DISCHARGE INSTRUCTIONS
"[facility]  Home Care Following Anesthesia  For 24 hours after surgery:  1. Get plenty of rest.  A responsible adult must stay with you for at least 24 hours after you leave the surgery center.  2. Do not drive or use heavy equipment.  If you have weakness or tingling, don't drive or use heavy equipment until this feeling goes away.   3. Do not drink alcohol.   4. Avoid strenuous or risky activities.  Ask for help when climbing stairs.  5. You may feel lightheaded.  IF so, sit for a few minutes before standing.  Have someone help you get up.   6. If you have nausea (feel sick to your stomach): Drink only clear liquids such as apple juice, ginger ale, broth or 7-Up.  Rest may also help.  Be sure to drink enough fluids.  Move to a regular diet as you feel able.   7. You may have a slight fever.  Call the doctor if your fever is over 100 F (37.7 C) (taken under the tongue) or lasts longer than 24 hours.  8. You may have a dry mouth, a sore throat, muscle aches or trouble sleeping. These should go away after 24 hours.  9. Do not make important or legal decisions.   10. It is recommended to avoid smoking.        Today you received a Marcaine or bupivacaine block to numb the nerves near your surgery site.  This is a block using local anesthetic or \"numbing\" medication injected around the nerves to anesthetize or \"numb\" the area supplied by those nerves.  This block is injected into the muscle layer near your surgical site.  The medication may numb the location where you had surgery for 6-18 hours, but may last up to 24 hours.  If your surgical site is an arm or leg you should be careful with your affected limb, since it is possible to injure your limb without being aware of it due to the numbing.  Until full feeling returns, you should guard against bumping or hitting your limb, and avoid extreme hot or cold temperatures on the skin.  As the block wears off, the feeling will return as " a tingling or prickly sensation near your surgical site.  You will experience more discomfort from your incision as the feeling returns.  You may want to take a pain pill (a narcotic or Tylenol if this was prescribed by your surgeon) when you start to experience mild pain before the pain beccomes more severe.  If your pain medications do not control your pain you should notifiy your surgeon.    Tips for taking pain medications  To get the best pain relief possible, remember these points:    Take pain medications as directed, before pain becomes severe.    Pain medication can upset your stomach: taking it with food may help.    Constipation is a common side effect of pain medication. Drink plenty of  fluids.    Eat foods high in fiber. Take a stool softener if recommended by your doctor or pharmacist.    Do not drink alcohol, drive or operate machinery while taking pain medications.    Ask about other ways to control pain, such as with heat, ice or relaxation.    Tylenol/Acetaminophen Consumption  To help encourage the safe use of acetaminophen, the makers of TYLENOL  have lowered the maximum daily dose for single-ingredient Extra Strength TYLENOL  (acetaminophen) products sold in the [place] from 8 pills per day (4,000 mg) to 6 pills per day (3,000 mg). The dosing interval has also changed from 2 pills every 4-6 hours to 2 pills every 6 hours.    If you feel your pain relief is insufficient, you may take Tylenol/Acetaminophen in addition to your narcotic pain medication.     Be careful not to exceed 3,000 mg of Tylenol/Acetaminophen in a 24 hour period from all sources.    If you are taking extra strength Tylenol/acetaminophen (500 mg), the maximum dose is 6 tablets in 24 hours.    If you are taking regular strength acetaminophen (325 mg), the maximum dose is 9 tablets in 24 hours.    Call a doctor for any of the followin. Signs of infection (fever, growing tenderness at the surgery site, a large amount of  drainage or bleeding, severe pain, foul-smelling drainage, redness, swelling).  2. It has been over 8 to 10 hours since surgery and you are still not able to urinate (pass water).  3. Headache for over 24 hours.  4. Numbness, tingling or weakness the day after surgery (if you had spinal anesthesia).  5. Signs of Covid-19 infection (temperature over 100 degrees, shortness of breath, cough, loss of taste/smell, generalized body aches, persistent headache, chills, sore throat, nausea/vomiting/diarrhea)  Your doctor is:  Dr. Abdirahman Rae, Orthopaedics: [phone]   Or dial [phone] and ask for the resident on call for:  Orthopaedics  For emergency care, call the:  [facility] Emergency Department: [phone] (TTY for hearing impaired: [phone])

## 2021-09-10 NOTE — ANESTHESIA PREPROCEDURE EVALUATION
Anesthesia Pre-Procedure Evaluation    Patient: Imani Albert   MRN: [id number] : 1970        Preoperative Diagnosis: Sarcoma (H) [C49.9]   Procedure : Procedure(s):  removal tumor left arm     Past Medical History:   Diagnosis Date     Depression      Diabetes mellitus, type 2 (H)      Heart murmur     Highschool     Migraines      Obesity      LEROY (obstructive sleep apnea)      Reactive airway disease      S/P radiation therapy     5,000 cGy to left arm completed on 2021 [facility]     Sarcoma (H) 2021    high grade pleomorphic sarcoma     Uncomplicated asthma       Past Surgical History:   Procedure Laterality Date     COLPOSCOPY       CYSTOSCOPY       LAPAROSCOPIC CHOLECYSTECTOMY       SPLENECTOMY  2007     SURGICAL PATHOLOGY EXAM Left 2021    Skin and Soft Tissue Excision of Left Upper Arm - [facility]     TUBAL LIGATION        Allergies   Allergen Reactions     Justicia Adhatoda ([place] Nut Tree) [Justicia Adhatoda] Anaphylaxis     Other reaction(s): Throat Swelling/Closing  All nuts that are packaged with shells     Nuts Angioedema     Any nuts w/shells       Rofecoxib      VIOXX              Other-Dizziness     Sertraline      Other reaction(s): Unknown Reaction      Social History     Tobacco Use     Smoking status: Former Smoker     Packs/day: 0.50     Years: 25.00     Pack years: 12.50     Types: Cigarettes     Quit date: 2003     Years since quittin.7     Smokeless tobacco: Never Used   Substance Use Topics     Alcohol use: Not Currently      Wt Readings from Last 1 Encounters:   09/10/21 99.8 kg (220 lb)        Anesthesia Evaluation   Pt has had prior anesthetic. Type: General and MAC.    No history of anesthetic complications       ROS/MED HX  ENT/Pulmonary: Comment: Typically pulls CPAP off at night  Past issues with asthma but none recently and no use of inhalers.     (+) sleep apnea, doesn't use CPAP, tobacco use, Past use,  (-) recent URI    Neurologic:     (+) migraines,     Cardiovascular:     (+) -----Previous cardiac testing   Echo: Date: Results:    Stress Test: Date: Results:    ECG Reviewed: Date: 2013 Results:  ST, read as septal infarct  Cath: Date: Results:   (-) taking anticoagulants/antiplatelets   METS/Exercise Tolerance: >4 METS Comment: Walks 10K without difficulty   Hematologic:  - neg hematologic  ROS  (-) history of blood transfusion   Musculoskeletal: Comment: Pain at site of radiation.      GI/Hepatic:  - neg GI/hepatic ROS     Renal/Genitourinary:  - neg Renal ROS     Endo: Comment: No meds    (+) type II DM, Last HgA1c: 6.0 , Not using insulin, - not using insulin pump. Obesity,     Psychiatric/Substance Use:  - neg psychiatric ROS     Infectious Disease:       Malignancy:   (+) Malignancy, History of Other.Other CA sarcoma Active status post Radiation.    Other:  - neg other ROS          Physical Exam    Airway        Mallampati: III       Respiratory Devices and Support         Dental           Cardiovascular          Rhythm and rate: regular and normal     Pulmonary           breath sounds clear to auscultation           OUTSIDE LABS:  CBC:   Lab Results   Component Value Date    WBC 12.2 (H) 09/02/2021    HGB 13.9 09/02/2021    HCT 43.0 09/02/2021     (H) 09/02/2021     BMP:   Lab Results   Component Value Date     09/02/2021    POTASSIUM 3.7 09/02/2021    CHLORIDE 105 09/02/2021    CO2 26 09/02/2021    BUN 13 09/02/2021    CR 0.60 09/02/2021     (H) 09/02/2021     COAGS: No results found for: PTT, INR, FIBR  POC:   Lab Results   Component Value Date    HCG Negative 09/10/2021     HEPATIC: No results found for: ALBUMIN, PROTTOTAL, ALT, AST, GGT, ALKPHOS, BILITOTAL, BILIDIRECT, RAGINI  OTHER:   Lab Results   Component Value Date    TELLO 9.3 09/02/2021       Anesthesia Plan    ASA Status:  3   NPO Status:  NPO Appropriate    Anesthesia Type: General.     - Airway: LMA   Induction: Intravenous.   Maintenance:  TIVA.        Consents    Anesthesia Plan(s) and associated risks, benefits, and realistic alternatives discussed. Questions answered and patient/representative(s) expressed understanding.     - Discussed with:  Patient         Postoperative Care    Pain management: Oral pain medications, Multi-modal analgesia.   PONV prophylaxis: Ondansetron (or other 5HT-3), Dexamethasone or Solumedrol     Comments:                Franko Archer MD

## 2021-09-10 NOTE — OP NOTE
Preop diagnosis: Tumor left proximal arm    Postoperative diagnosis: Same    Procedure performed: tumor bed left proximal arm, skin and subcutaneous tissue measuring 4 x 5 x 1 cm.      Surgeons: Yan Rae and Debbie MAHONEY.  Ms. Archer's assistance was required for safe visualization and hemostasis throughout the seizure.    Estimated blood loss: 5 cc    Pathology submitted: Tumor bed left proximal arm skin is superficial and posterior.  Short stitch is distal long stitch is medial.    Patient was interviewed in the preoperative area with her friend present.  Risk and benefits have been reviewed.  Consent was signed and the surgical site was marked my initials and marked in line of my intended incision.    Preoperative briefing been performed.  She is taken the operating room receiving general anesthetic in the lateral position the left shoulder and arm were prepped and draped sterilely.  Surgical timeout was performed.  A 7 cm incision was made to ellipse out the prior surgical tract.  Dissection was taken down deep into the arm to the superficial fascia.  The entire specimen was lifted from the wound the wound was irrigated and closed with subcutaneous and skin layers.  Postoperative debrief was performed.    Plan: 1.  We will contact the patient with the results of presence or absence of tumor and her margins.  2.  Follow-up in clinic in 3 weeks.

## 2021-09-10 NOTE — ANESTHESIA CARE TRANSFER NOTE
Patient: Imani Albert    Procedure(s):  removal tumor left arm    Diagnosis: Sarcoma (H) [C49.9]  Diagnosis Additional Information: No value filed.    Anesthesia Type:   General     Note:    Oropharynx: oropharynx clear of all foreign objects  Level of Consciousness: awake  Oxygen Supplementation: face mask    Independent Airway: airway patency satisfactory and stable  Dentition: dentition unchanged  Vital Signs Stable: post-procedure vital signs reviewed and stable  Report to RN Given: handoff report given  Patient transferred to: PACU    Handoff Report: Identifed the Patient, Identified the Reponsible Provider, Reviewed the pertinent medical history, Discussed the surgical course, Reviewed Intra-OP anesthesia mangement and issues during anesthesia, Set expectations for post-procedure period and Allowed opportunity for questions and acknowledgement of understanding      Vitals:  Vitals Value Taken Time   /85 09/10/21 1252   Temp     Pulse 80 09/10/21 1252   Resp 18 09/10/21 1254   SpO2 99 % 09/10/21 1254   Vitals shown include unvalidated device data.    Electronically Signed By: HARRIET Rogers CRNA  September 10, 2021  12:55 PM

## 2021-09-15 LAB
PATH REPORT.COMMENTS IMP SPEC: NORMAL
PATH REPORT.COMMENTS IMP SPEC: NORMAL
PATH REPORT.FINAL DX SPEC: NORMAL
PATH REPORT.GROSS SPEC: NORMAL
PATH REPORT.MICROSCOPIC SPEC OTHER STN: NORMAL
PHOTO IMAGE: NORMAL

## 2021-09-15 PROCEDURE — 88309 TISSUE EXAM BY PATHOLOGIST: CPT | Mod: 26 | Performed by: PATHOLOGY

## 2021-09-17 ENCOUNTER — TELEPHONE (OUTPATIENT)
Dept: ORTHOPEDICS | Facility: CLINIC | Age: 51
End: 2021-09-17

## 2021-09-17 NOTE — TELEPHONE ENCOUNTER
----- Message from Abdirahman Rae MD sent at 9/16/2021 12:31 PM CDT -----  Please call the patient with the results.        Abdirahman Rae MD      Case Report   Surgical Pathology Report                         Case: [id number]                                   Authorizing Provider:  Abdirahman Rae MD   Collected:           09/10/2021 12:26 PM           Ordering Location:     [facility] OR  Received:            09/10/2021 01:08 PM                                  [place]                                                                   Pathologist:           Li Storm MD                                                            Specimen:    Shoulder, Left, Tumor bed left shoulder                                                    Final Diagnosis   Soft tissue, left shoulder tumor bed, excision:  - Skin and subcutaneous tissue with therapy-related changes  - Negative for residual malignancy

## 2021-10-01 ENCOUNTER — OFFICE VISIT (OUTPATIENT)
Dept: ORTHOPEDICS | Facility: CLINIC | Age: 51
End: 2021-10-01
Payer: COMMERCIAL

## 2021-10-01 DIAGNOSIS — C49.12: Primary | ICD-10-CM

## 2021-10-01 PROCEDURE — 99024 POSTOP FOLLOW-UP VISIT: CPT | Performed by: PHYSICIAN ASSISTANT

## 2021-10-01 NOTE — NURSING NOTE
Reason For Visit:   Chief Complaint   Patient presents with     RECHECK     3 week POP // removal Tumor Left Arm // DOS 9/10/21       There were no vitals taken for this visit.    Pain Assessment  Patient Currently in Pain: Yes  0-10 Pain Scale: 4 (numbness and tingling on top part of incision, movement throughout day causes additional pain, calms down in evening, patient states she slept on it and it caused a great deal of nerve pain)    Franko Moran, EMT

## 2021-10-01 NOTE — PROGRESS NOTES
Chief Complaint: left shoulder wound check  Preop diagnosis: Tumor left proximal arm     9/10/21 Procedure performed: tumor bed left proximal arm, skin and subcutaneous tissue measuring 4 x 5 x 1 cm.    HPI: Harvey is a 51-year-old female here today for follow-up of her left shoulder wound bed excision by Dr. Rae.  She has a history of pleomorphic liposarcoma of the left arm with previous excision and positive margins at an outside hospital.  She did complete radiation prior to this wound bed excision.  She reports that the left arm is still a little bit tender and she cannot lay on it in bed.  She is wondering when that will improve.  She is using cream on the wound.  She has started walking again for exercise.  She denies any numbness and tingling in the extremity, but does have some numbness around the incision.  She gets occasional shooting pain in the wound bed.  No other concerns.    Physical Exam: Imani is a pleasant 51-year-old female who is alert and oriented in no apparent distress.  Her left posterior upper arm incision is healing well.  Suture tails are present and they were trimmed today, as there is some irritation around the.  She has residual swelling and tenderness.  No erythema or ecchymosis.  There is some firmness of the tissue related to radiation.  She has full left shoulder and elbow range of motion.    Pathology: Soft tissue, left shoulder tumor bed, excision:  - Skin and subcutaneous tissue with therapy-related changes  - Negative for residual malignancy    Impression: 51-year-old female doing well status post wound bed excision of left posterior upper arm for pleomorphic liposarcoma with no sign of residual tumor    Plan: Harvey is doing well overall.  She should continue with the cream and scar massage to the left arm.  I talked to her about late radiation effects, and she needs to work on stretching the arm to prevent stiffness.  She is due for a chest CT this coming week.  We will get  that scheduled, she prefers [place] or [place].  We will call her with results.  In late January, she is then again due for repeat chest CT as well as an MRI with and without contrast of the left upper arm to evaluate for any tumor recurrence.  I offered her same-day scans and visit, but she preferred to get this done closer to home and be called with the results.  We will help her set that up.  She can call with any questions.  She is also due for a mammogram and a skin contact her PCP to get that ordered as well.  All questions answered.

## 2021-10-01 NOTE — LETTER
10/1/2021         RE: Imani Albert  [address]  [place] [address]        Dear Colleague,    Thank you for referring your patient, Imani Albert, to the [facility] [place]. Please see a copy of my visit note below.    Chief Complaint: left shoulder wound check  Preop diagnosis: Tumor left proximal arm     9/10/21 Procedure performed: tumor bed left proximal arm, skin and subcutaneous tissue measuring 4 x 5 x 1 cm.    HPI: Harvey is a 51-year-old female here today for follow-up of her left shoulder wound bed excision by Dr. Rae.  She has a history of pleomorphic liposarcoma of the left arm with previous excision and positive margins at an outside hospital.  She did complete radiation prior to this wound bed excision.  She reports that the left arm is still a little bit tender and she cannot lay on it in bed.  She is wondering when that will improve.  She is using cream on the wound.  She has started walking again for exercise.  She denies any numbness and tingling in the extremity, but does have some numbness around the incision.  She gets occasional shooting pain in the wound bed.  No other concerns.    Physical Exam: Imani is a pleasant 51-year-old female who is alert and oriented in no apparent distress.  Her left posterior upper arm incision is healing well.  Suture tails are present and they were trimmed today, as there is some irritation around the.  She has residual swelling and tenderness.  No erythema or ecchymosis.  There is some firmness of the tissue related to radiation.  She has full left shoulder and elbow range of motion.    Pathology: Soft tissue, left shoulder tumor bed, excision:  - Skin and subcutaneous tissue with therapy-related changes  - Negative for residual malignancy    Impression: 51-year-old female doing well status post wound bed excision of left posterior upper arm for pleomorphic liposarcoma with no sign of residual  tumor    Plan: Harvey is doing well overall.  She should continue with the cream and scar massage to the left arm.  I talked to her about late radiation effects, and she needs to work on stretching the arm to prevent stiffness.  She is due for a chest CT this coming week.  We will get that scheduled, she prefers [place] or [place].  We will call her with results.  In late January, she is then again due for repeat chest CT as well as an MRI with and without contrast of the left upper arm to evaluate for any tumor recurrence.  I offered her same-day scans and visit, but she preferred to get this done closer to home and be called with the results.  We will help her set that up.  She can call with any questions.  She is also due for a mammogram and a skin contact her PCP to get that ordered as well.  All questions answered.      Marlene Archer PA-C

## 2021-10-07 ENCOUNTER — ANCILLARY PROCEDURE (OUTPATIENT)
Dept: CT IMAGING | Facility: CLINIC | Age: 51
End: 2021-10-07
Attending: PHYSICIAN ASSISTANT
Payer: COMMERCIAL

## 2021-10-07 DIAGNOSIS — C49.12: ICD-10-CM

## 2021-10-07 PROCEDURE — 71250 CT THORAX DX C-: CPT | Performed by: RADIOLOGY

## 2021-10-13 ENCOUNTER — TELEPHONE (OUTPATIENT)
Dept: ORTHOPEDICS | Facility: CLINIC | Age: 51
End: 2021-10-13

## 2021-10-24 ENCOUNTER — HEALTH MAINTENANCE LETTER (OUTPATIENT)
Age: 51
End: 2021-10-24

## 2021-11-01 ENCOUNTER — VIRTUAL VISIT (OUTPATIENT)
Dept: RADIATION ONCOLOGY | Facility: CLINIC | Age: 51
End: 2021-11-01
Attending: RADIOLOGY
Payer: COMMERCIAL

## 2021-11-01 DIAGNOSIS — C49.9 SARCOMA OF SOFT TISSUE (H): Primary | ICD-10-CM

## 2021-11-01 NOTE — LETTER
11/1/2021         RE: Imani Albert  [address]  [address]  [place] [address]        Dear Colleague,    Thank you for referring your patient, Imani Albert, to the [facility]. Please see a copy of my visit note below.    no show    no answer                  Again, thank you for allowing me to participate in the care of your patient.        Sincerely,        Maite Long MD

## 2022-01-25 ENCOUNTER — ANCILLARY PROCEDURE (OUTPATIENT)
Dept: MRI IMAGING | Facility: CLINIC | Age: 52
End: 2022-01-25
Attending: PHYSICIAN ASSISTANT
Payer: COMMERCIAL

## 2022-01-25 ENCOUNTER — ANCILLARY PROCEDURE (OUTPATIENT)
Dept: CT IMAGING | Facility: CLINIC | Age: 52
End: 2022-01-25
Attending: PHYSICIAN ASSISTANT
Payer: COMMERCIAL

## 2022-01-25 DIAGNOSIS — C49.12: ICD-10-CM

## 2022-01-25 PROCEDURE — A9585 GADOBUTROL INJECTION: HCPCS | Performed by: RADIOLOGY

## 2022-01-25 PROCEDURE — 73220 MRI UPPR EXTREMITY W/O&W/DYE: CPT | Mod: LT | Performed by: RADIOLOGY

## 2022-01-25 PROCEDURE — 71250 CT THORAX DX C-: CPT | Mod: GC | Performed by: RADIOLOGY

## 2022-01-25 RX ORDER — GADOBUTROL 604.72 MG/ML
10 INJECTION INTRAVENOUS ONCE
Status: COMPLETED | OUTPATIENT
Start: 2022-01-25 | End: 2022-01-25

## 2022-01-25 RX ADMIN — GADOBUTROL 10 ML: 604.72 INJECTION INTRAVENOUS at 13:20

## 2022-01-26 ENCOUNTER — TELEPHONE (OUTPATIENT)
Dept: ORTHOPEDICS | Facility: CLINIC | Age: 52
End: 2022-01-26

## 2022-01-26 ENCOUNTER — VIRTUAL VISIT (OUTPATIENT)
Dept: ORTHOPEDICS | Facility: CLINIC | Age: 52
End: 2022-01-26
Payer: COMMERCIAL

## 2022-01-26 DIAGNOSIS — C49.12: Primary | ICD-10-CM

## 2022-01-26 PROCEDURE — 99212 OFFICE O/P EST SF 10 MIN: CPT | Mod: 95 | Performed by: PHYSICIAN ASSISTANT

## 2022-01-26 NOTE — LETTER
1/26/2022         RE: Imani Albert  [address]  [address]  [place] [address]        Dear Colleague,    Thank you for referring your patient, Imani Albert, to the [facility]. Please see a copy of my visit note below.    Imani is a 51 year old who is being evaluated via a billable telephone visit.      Preop diagnosis: Tumor left proximal arm     9/10/21 Procedure performed: tumor bed left proximal arm, skin and subcutaneous tissue measuring 4 x 5 x 1 cm    What phone number would you like to be contacted at? [phone]  How would you like to obtain your AVS? 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  I explained to Imani that she may have some late effects of radiation that is causing the numbness, or it could be related to scar tissue.  She can continue to try range of motion and massage.  She may see some improvement over the next several months, as nerves are very slow to recover.  If her symptoms travel below the elbow into her hand, we would want to know about that.  Otherwise she can participate in activities as tolerated.  We will see her back to go over her follow-up scans and assess her numbness.  She agrees with the plan.  All questions answered.     Marlene Archer PA-C  [facility]    Subjective   Imani is a 51 year old who presents for follow-up of her right upper extremity  sarcoma.  We are doing a telephone visit today to discuss her MRI and chest CT results.  Patient reports that overall she is doing well.  She does have some increased numbness around the distal portion of the scar down to her elbow.  It does not go into her forearm or hand.  It feels strange and hypersensitive when she brushes it.  She feels that massaging it makes it more irritated.  She is otherwise doing well.  She hopes to get out more walking when the weather gets warmer.    MRI of right upper extremity w/ & w/o contrast show: Irregular, predominantly linear area of edema and enhancement in the posterior subcutaneous fat in this patient status  post negative repeat excision 9/10/2021. Findings likely represent postsurgical change.    Results for orders placed or performed in visit on 01/25/22 (from the past 24 hour(s))   CT Chest w/o contrast    Narrative    EXAMINATION: Chest CT  1/25/2022 2:51 PM    CLINICAL HISTORY: h/o left arm pleomorphic liposarcoma, eval for lung  nodules; Liposarcoma of upper extremity, left (H)    COMPARISON: Chest CT 10/7/2021, whole body PET CT 6/28/2021.    TECHNIQUE: CT imaging obtained through the chest without contrast.  Axial, coronal, and sagittal reconstructions and axial MIP reformatted  images are reviewed.     FINDINGS:  Lungs: The trachea and central airways are patent. No pneumothorax or  pleural effusion. No focal airspace opacity. No new or enlarging  pulmonary nodules. Stable 7 mm calcified granuloma in the left upper  lobe (series 6, image 64).  -6 mm groundglass nodule left lower lobe (series 6, image 129).  -3 mm partially calcified nodule in the lingula (series 6, image 167).  This previously demonstrated a more solid appearance without  calcification. This nodule likely represents a partially calcified  granuloma. Additional calcified granulomas in the right upper lobe  (6/62) and left upper lobe (6/73).  -Stable 4 mm nodule in the left lower lobe  (6/95).  Mediastinum: Subcentimeter hypodense nodule in the right lobe of the  thyroid. The heart size is within normal limits. No pericardial  effusion. The ascending aorta and main pulmonary artery diameters are  within normal limits. Normal appearance and configuration of the great  vessels off of the aortic arch. No suspicious mediastinal, hilar, or  axillary lymph nodes. Unchanged calcified pretracheal lymph node and  left hilar nodes, likely sequelae of prior granulomatous disease.    Bones and soft tissues: No suspicious bone findings. Unchanged benign  probable bone island in the T6 vertebral body.    Upper Abdomen: Unremarkable appearance of the adrenal glands.  Splenectomy. Cholecystectomy. Hyperdensity in the left renal  collecting system likely related to the earlier same day  contrast-enhanced upper extremity MRI.      Impression    IMPRESSION:   No new or enlarging pulmonary nodule. Unchanged 6 mm groundglass  nodule and 4mm solid nodules in the left lower lobe. Continued  attention on follow-up.    I have personally reviewed the examination and initial interpretation  and I agree with the findings.    KELLY YAN MD         SYSTEM ID:  [id number]       Phone call duration: 7 minutes      Marlene Archer PA-C

## 2022-01-26 NOTE — NURSING NOTE
Reason For Visit:   Chief Complaint   Patient presents with     RECHECK     review MRI and CT results        There were no vitals taken for this visit.    Pain Assessment  Patient Currently in Pain: Yes  0-10 Pain Scale: 3 (with being touched)  Primary Pain Location: Arm        Roselyn Vicente ATC

## 2022-01-26 NOTE — PROGRESS NOTES
Imani is a 51 year old who is being evaluated via a billable telephone visit.      Preop diagnosis: Tumor left proximal arm     9/10/21 Procedure performed: tumor bed left proximal arm, skin and subcutaneous tissue measuring 4 x 5 x 1 cm    What phone number would you like to be contacted at? [phone]  How would you like to obtain your AVS? 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  I explained to Imani that she may have some late effects of radiation that is causing the numbness, or it could be related to scar tissue.  She can continue to try range of motion and massage.  She may see some improvement over the next several months, as nerves are very slow to recover.  If her symptoms travel below the elbow into her hand, we would want to know about that.  Otherwise she can participate in activities as tolerated.  We will see her back to go over her follow-up scans and assess her numbness.  She agrees with the plan.  All questions answered.     Marlene Archer PA-C  [facility] CLINIC [facility]   Imani is a 51 year old who presents for follow-up of her right upper extremity sarcoma.  We are doing a telephone visit today to discuss her MRI and chest CT results.  Patient reports that overall she is doing well.  She does have some increased numbness around the distal portion of the scar down to her elbow.  It does not go into her forearm or hand.   It feels strange and hypersensitive when she brushes it.  She feels that massaging it makes it more irritated.  She is otherwise doing well.  She hopes to get out more walking when the weather gets warmer.    MRI of right upper extremity w/ & w/o contrast show: Irregular, predominantly linear area of edema and enhancement in the posterior subcutaneous fat in this patient status  post negative repeat excision 9/10/2021. Findings likely represent postsurgical change.    Results for orders placed or performed in visit on 01/25/22 (from the past 24 hour(s))   CT Chest w/o contrast    Narrative    EXAMINATION: Chest CT  1/25/2022 2:51 PM    CLINICAL HISTORY: h/o left arm pleomorphic liposarcoma, eval for lung  nodules; Liposarcoma of upper extremity, left (H)    COMPARISON: Chest CT 10/7/2021, whole body PET CT 6/28/2021.    TECHNIQUE: CT imaging obtained through the chest without contrast.  Axial, coronal, and sagittal reconstructions and axial MIP reformatted  images are reviewed.     FINDINGS:  Lungs: The trachea and central airways are patent. No pneumothorax or  pleural effusion. No focal airspace opacity. No new or enlarging  pulmonary nodules. Stable 7 mm calcified granuloma in the left upper  lobe (series 6, image 64).  -6 mm groundglass nodule left lower lobe (series 6, image 129).  -3 mm partially calcified nodule in the lingula (series 6, image 167).  This previously demonstrated a more solid appearance without  calcification. This nodule likely represents a partially calcified  granuloma. Additional calcified granulomas in the right upper lobe  (6/62) and left upper lobe (6/73).  -Stable 4 mm nodule in the left lower lobe (6/95).  Mediastinum: Subcentimeter hypodense nodule in the right lobe of the  thyroid. The heart size is within normal limits. No pericardial  effusion. The ascending aorta and main pulmonary artery diameters are  within normal limits. Normal appearance and configuration of the  great  vessels off of the aortic arch. No suspicious mediastinal, hilar, or  axillary lymph nodes. Unchanged calcified pretracheal lymph node and  left hilar nodes, likely sequelae of prior granulomatous disease.    Bones and soft tissues: No suspicious bone findings. Unchanged benign  probable bone island in the T6 vertebral body.    Upper Abdomen: Unremarkable appearance of the adrenal glands.  Splenectomy. Cholecystectomy. Hyperdensity in the left renal  collecting system likely related to the earlier same day  contrast-enhanced upper extremity MRI.      Impression    IMPRESSION:   No new or enlarging pulmonary nodule. Unchanged 6 mm groundglass  nodule and 4mm solid nodules in the left lower lobe. Continued  attention on follow-up.    I have personally reviewed the examination and initial interpretation  and I agree with the findings.    KELLY YAN MD         SYSTEM ID:  [id number]       Phone call duration: 7 minutes

## 2022-02-13 ENCOUNTER — HEALTH MAINTENANCE LETTER (OUTPATIENT)
Age: 52
End: 2022-02-13

## 2022-07-12 ENCOUNTER — ANCILLARY PROCEDURE (OUTPATIENT)
Dept: CT IMAGING | Facility: CLINIC | Age: 52
End: 2022-07-12
Attending: PHYSICIAN ASSISTANT
Payer: COMMERCIAL

## 2022-07-12 ENCOUNTER — ANCILLARY PROCEDURE (OUTPATIENT)
Dept: MRI IMAGING | Facility: CLINIC | Age: 52
End: 2022-07-12
Attending: PHYSICIAN ASSISTANT
Payer: COMMERCIAL

## 2022-07-12 DIAGNOSIS — C49.12: ICD-10-CM

## 2022-07-12 PROCEDURE — A9585 GADOBUTROL INJECTION: HCPCS | Performed by: RADIOLOGY

## 2022-07-12 PROCEDURE — 73220 MRI UPPR EXTREMITY W/O&W/DYE: CPT | Mod: LT | Performed by: RADIOLOGY

## 2022-07-12 PROCEDURE — 71250 CT THORAX DX C-: CPT | Mod: GC | Performed by: RADIOLOGY

## 2022-07-12 RX ORDER — GADOBUTROL 604.72 MG/ML
10 INJECTION INTRAVENOUS ONCE
Status: COMPLETED | OUTPATIENT
Start: 2022-07-12 | End: 2022-07-12

## 2022-07-12 RX ADMIN — GADOBUTROL 10 ML: 604.72 INJECTION INTRAVENOUS at 15:49

## 2022-07-14 ENCOUNTER — TELEPHONE (OUTPATIENT)
Dept: ORTHOPEDICS | Facility: CLINIC | Age: 52
End: 2022-07-14

## 2022-07-14 NOTE — TELEPHONE ENCOUNTER
Called pt and left msg about recent scans with no concerning findings.  Repeat MRI and Chest CT in 4 months.  Will enter orders and pt can get schedule.  Call with questions.

## 2022-07-31 ENCOUNTER — HEALTH MAINTENANCE LETTER (OUTPATIENT)
Age: 52
End: 2022-07-31

## 2022-10-15 ENCOUNTER — HEALTH MAINTENANCE LETTER (OUTPATIENT)
Age: 52
End: 2022-10-15

## 2022-12-12 ENCOUNTER — MEDICAL CORRESPONDENCE (OUTPATIENT)
Dept: HEALTH INFORMATION MANAGEMENT | Facility: CLINIC | Age: 52
End: 2022-12-12

## 2022-12-12 DIAGNOSIS — C49.12: Primary | ICD-10-CM

## 2022-12-31 ENCOUNTER — TRANSFERRED RECORDS (OUTPATIENT)
Dept: HEALTH INFORMATION MANAGEMENT | Facility: CLINIC | Age: 52
End: 2022-12-31

## 2023-01-17 PROBLEM — C49.9 SARCOMA (H): Status: ACTIVE | Noted: 2021-05-26

## 2023-03-26 ENCOUNTER — HEALTH MAINTENANCE LETTER (OUTPATIENT)
Age: 53
End: 2023-03-26

## 2023-06-23 ENCOUNTER — TELEPHONE (OUTPATIENT)
Dept: ORTHOPEDICS | Facility: CLINIC | Age: 53
End: 2023-06-23

## 2023-06-23 NOTE — TELEPHONE ENCOUNTER
I spoke with this patient about following up with Dr. Rae. She states she had imaging done in [place] in December last year, but that no one followed up with her. She states she contacted the [facility] and requested her care be transferred there.     I apologized for the delay in her care.     She had questions about getting records transferred. She states she filled out a ABBEY with the [facility]. I told her I assumed they were working with our medical records department to get her records transferred.    Marietta Renae LPN

## 2023-07-05 NOTE — TELEPHONE ENCOUNTER
William has informed me that the reports for the imaging are being faxed.    Marietta Renae LPN

## 2023-07-06 ENCOUNTER — TELEPHONE (OUTPATIENT)
Dept: ORTHOPEDICS | Facility: CLINIC | Age: 53
End: 2023-07-06
Payer: COMMERCIAL

## 2023-07-06 NOTE — CONFIDENTIAL NOTE
Talked to Imani at length.  She is going to follow-up with an oncology group in [place], which is fine.  They have access to her records.  They are willing to do the MRI of her extremity and a chest CT.  As long as she is getting her staging follow-up, we are okay with.  We are happy to help in any way or offer advice.  She is pleased with this outcome.  She was sick for 3 to 4 months and really could not follow-up with us.  She is doing better now.  All questions answered.

## 2023-08-14 ENCOUNTER — MEDICAL CORRESPONDENCE (OUTPATIENT)
Dept: HEALTH INFORMATION MANAGEMENT | Facility: CLINIC | Age: 53
End: 2023-08-14
Payer: COMMERCIAL

## 2023-08-15 ENCOUNTER — TRANSCRIBE ORDERS (OUTPATIENT)
Dept: OTHER | Age: 53
End: 2023-08-15

## 2023-08-15 DIAGNOSIS — M89.9 BONE LESION: Primary | ICD-10-CM

## 2023-08-16 ENCOUNTER — TELEPHONE (OUTPATIENT)
Dept: ORTHOPEDICS | Facility: CLINIC | Age: 53
End: 2023-08-16

## 2023-08-16 NOTE — TELEPHONE ENCOUNTER
SUBJECT:  RED FLAG TUMOR NEEDS VISIT    Hello,  I'm with ortho con.  Pt has an order for Dr. Rae for an ortho onc visit for a bone lesion.  I find next Wednesday by video only.  Is there a sooner option? Thank you.

## 2023-08-16 NOTE — TELEPHONE ENCOUNTER
Writer called and left a  for patient to schedule an appointment with Dr. Rae. Writer left direct line to call back.     If patient calls clinic line she can be scheduled as a return visit as she has seen Dr. Rae in the past. We would want to get her in with Dr. Rae within 72 hours of her calling though. If this cannot be arranged transfer the call to [facility] or have patient call [phone], or Marietta Renae can call her back.    Please route encounter to Marietta Renae if/when scheduled so she can be sure imaging and records will be collected.     Thanks!    Marietta Renae LPN

## 2023-08-16 NOTE — LETTER
8/21/2023         RE: Imani Albert  [address]  [address]  [place] [address]        Dear Imani,    I have tried reaching you multiple times by phone but have been unable to. Dr. Isacc Thomson from [facility] has referred you to see Dr. Rae for a bone lesion. Please call me at [phone] to schedule this appointment as soon as possible.     Thank you!    Marietta Renae LPN  She/Her/ Hers  Orthopedic and Oncology Clinic  [facility]  [address] [id number] [phone]

## 2023-08-17 NOTE — TELEPHONE ENCOUNTER
VM left requesting a return call to schedule an appointment with Dr. Rae.    Marietta Renae LPN

## 2023-08-18 NOTE — TELEPHONE ENCOUNTER
VM left requesting a return call to set up an appointment with Dr. Rae.     Message left for Isacc Thomson's team to reach out to patient to encourage her to return my phone calls to set up appointment with Dr. Rae.    Marietta Renae LPN

## 2023-08-20 ENCOUNTER — HEALTH MAINTENANCE LETTER (OUTPATIENT)
Age: 53
End: 2023-08-20

## 2023-08-21 NOTE — TELEPHONE ENCOUNTER
Letter mailed both in regular mail and certified [phone]. Will wait for patient response.    Marietta Renae LPN

## 2024-01-07 ENCOUNTER — HEALTH MAINTENANCE LETTER (OUTPATIENT)
Age: 54
End: 2024-01-07

## 2024-01-22 NOTE — TELEPHONE ENCOUNTER
Action January 22, 2024 10:56 AM MT   Action Taken Called patient, need to upda[facility] records. Patient did not answer, left a detailed voicemail for a callback.      Action January 23, 2024 8:08 AM MT   Action Taken Patient called back and left a voicemail stating they give authorization to update their [facility] records. Sent a request for imaging from [facility].      DIAGNOSIS: Right Buttock Lesion  Bone lesion [M89.9]  - Primary   APPOINTMENT DATE: 01/23/2024   NOTES STATUS DETAILS   OFFICE NOTE from referring provider Care Everywhere 08/14/2023 - Isacc Thomson MD    OFFICE NOTE from other specialist Internal [facility]   MEDICATION LIST Care Everywhere    CT SCAN PACS Internal    [facility]:  08/11/2023 - Chest  03/24/2023 - Abd/Pel  08/23/2013 - Chest/ Abd/Pel   XRAYS (IMAGES & REPORTS) PACS [facility]:  04/06/2015 - L Spine  08/11/2014 - RT Hip

## 2024-01-23 ENCOUNTER — PRE VISIT (OUTPATIENT)
Dept: ORTHOPEDICS | Facility: CLINIC | Age: 54
End: 2024-01-23

## 2024-01-23 ENCOUNTER — VIRTUAL VISIT (OUTPATIENT)
Dept: ORTHOPEDICS | Facility: CLINIC | Age: 54
End: 2024-01-23
Payer: COMMERCIAL

## 2024-01-23 VITALS — HEIGHT: 62 IN | BODY MASS INDEX: 38.46 KG/M2 | WEIGHT: 209 LBS

## 2024-01-23 DIAGNOSIS — C49.12: Primary | ICD-10-CM

## 2024-01-23 PROCEDURE — 99213 OFFICE O/P EST LOW 20 MIN: CPT | Mod: 95 | Performed by: ORTHOPAEDIC SURGERY

## 2024-01-23 ASSESSMENT — PAIN SCALES - GENERAL: PAINLEVEL: NO PAIN (0)

## 2024-01-23 NOTE — PROGRESS NOTES
Virtual Visit Details    Type of service:  Video Visit     Impression:No evidence of local or distant metastasis from previously treated upper extremity pleomorphic sarcoma.    Plan: 1.  Dr. Thomson to order a chest x-ray and MRI scan of the left arm to rule out sarcoma relapse.  2.   to order chest x-ray left arm MRI in 6 months, 18 months and 30 months from today.  This will complete 5 years of follow-up.  3.  [facility] team to order a pelvic MRI with contrast and a marker on the right buttock mass.  Image orders have been completed.  4.  Virtual visit with myself and the patient after the pelvic MRI has been completed.    Diagnosis: 2 cm subcutaneous pleomorphic sarcoma.    Treatment: Excision in September 21 following prior excision with positive margins and preoperative radiation.    Imani was at home I was at the [place] on the medical campus for this video visit.    She has no complaints.  She denies any nodularity in the left arm but did mention a mass in the right buttock which she believes her oncology team at [facility] is concerned about.  By her description they would like us to excise the mass.  I discussed this with her and recommended a pretreatment MRI scan.  She is in agreement with this.  We would then follow-up with a virtual visit.        This visit began at 1 PM and ended at 1:22 PM.  The total length of the established patient visit was 22 minutes.

## 2024-01-23 NOTE — PATIENT INSTRUCTIONS
Impression:No evidence of local or distant metastasis from previously treated upper extremity pleomorphic sarcoma.    Plan: 1.  Dr. Thomson to order a chest x-ray and MRI scan of the left arm to rule out sarcoma relapse.  2.   to order chest x-ray left arm MRI in 6 months, 18 months and 30 months from today.  This will complete 5 years of follow-up.  3.  [facility] team to order a pelvic MRI with contrast and a marker on the right buttock mass.  Image orders have been completed.  4.  Virtual visit with myself and the patient after the pelvic MRI has been completed.

## 2024-01-23 NOTE — LETTER
1/23/2024         RE: Imani Albert  [address]  [address]  [place] [address]        Dear Colleague,    Thank you for referring your patient, Imani Albert, to the [facility]THOPED[facility] CLINIC [place]. Please see a copy of my visit note below.    Virtual Visit Details    Type of service:  Video Visit     Impression:No evidence of local or distant metastasis from previously treated upper extremity pleomorphic sarcoma.    Plan: 1.  Dr. Thomson to order a chest x-ray and MRI scan of the left arm to rule out sarcoma relapse.  2.   to order chest x-ray left arm MRI in 6 months, 18 months and 30 months from today.  This will complete 5 years of follow-up.  3.  [facility] team to order a pelvic MRI with contrast and a marker on the right buttock mass.  Image orders have been completed.  4.  Virtual visit with myself and the patient after the pelvic MRI has been completed.    Diagnosis: 2 cm subcutaneous pleomorphic sarcoma.    Treatment: Excision in September 21 following prior excision with positive margins and preoperative radiation.    Imani was at home I was at the [place] on the [facility] campus for this video visit.    She has no complaints.  She denies any nodularity in the left arm but did mention a mass in the right buttock which she believes her oncology team at [facility] is concerned about.  By her description they would like us to excise the mass.  I discussed this with her and recommended a pretreatment MRI scan.  She is in agreement with this.  We would then follow-up with a virtual visit.        This visit began at 1 PM and ended at 1:22 PM.  The total length of the established patient visit was 22 minutes.    Sincerely,        Abdirahman Rae MD

## 2024-01-23 NOTE — NURSING NOTE
"    Is the patient currently in the state of [place]? YES    Visit mode:VIDEO    If the visit is dropped, the patient can be reconnected by: VIDEO VISIT: Text to cell phone:   Telephone Information:   Mobile [phone]       Will anyone else be joining the visit? NO  (If patient encounters technical issues they should call [phone]    How would you like to obtain your AVS? MyChart    Are changes needed to the allergy or medication list? No    Reason for visit: Consult (Patient said, \"Sarcoma Cancer was treated a couple years ago for left upper arm and noticed a new hard spot on right between butt and hip area, [facility] looked at it and that provider thinks it should be removed due to the area and how it looked-not scans done. \")      Alaina Calle VVF     "

## 2024-02-27 ENCOUNTER — VIRTUAL VISIT (OUTPATIENT)
Dept: ORTHOPEDICS | Facility: CLINIC | Age: 54
End: 2024-02-27
Payer: COMMERCIAL

## 2024-02-27 VITALS — HEIGHT: 62 IN | BODY MASS INDEX: 38.64 KG/M2 | WEIGHT: 210 LBS

## 2024-02-27 DIAGNOSIS — C49.12: Primary | ICD-10-CM

## 2024-02-27 PROCEDURE — 99213 OFFICE O/P EST LOW 20 MIN: CPT | Mod: 95 | Performed by: ORTHOPAEDIC SURGERY

## 2024-02-27 ASSESSMENT — PAIN SCALES - GENERAL: PAINLEVEL: NO PAIN (0)

## 2024-02-27 NOTE — LETTER
2/27/2024       RE: Imani Albert  [address]  [address]  [place] [address]    Dear Colleague,    Thank you for referring your patient, Imani Albert, to the [facility] ORTHOPEDIC [facility]. Please see a copy of my visit note below.    Impression: No evidence of tumor recurrence in the arm or chest.  Despite negative imaging the patient feels there is still a mass in her buttock.    Plan: Patient needs to be scheduled to see me face-to-face in March 23 in the [facility] building.  We will perform a physical exam at that time.    Diagnosis: 2 cm subcutaneous pleomorphic sarcoma.     Treatment: Excision in September 21 following prior excision with positive margins and preoperative radiation.    Patient is seen to review her state her surveillance follow-up studies which are primarily a chest CT and an MRI scan of her operated upper extremity.  She denies feeling any new masses in the arm.  The MRI scan of the arm and chest CT scan been reviewed by myself as well as radiologist.  There is no evidence of recurrence.    At her last visit the patient mentioned a right buttock mass.  Her oncologist examine this and recommended that be excised.  We therefore obtained an MRI scan of the pelvis including the majority of the right buttocks..  The patient reports she placed a marker at the time of the scan.  I was not able to reliably visualized the marker on the new MRI scan of the pelvis.  This could be related to the relationship between the size of the scanner and the size of the patient.  I mention to Harvey if she would like further consideration of having the mass excised she would need to come to the clinic and undergo if face-to-face evaluation physical exam.  She understands this and will schedule an appointment for March 23.    This virtual visit began at 4:50 PM and ended at 5:05 PM.  The total visit was 15 minutes.      Abdirahman Rae MD

## 2024-02-27 NOTE — PATIENT INSTRUCTIONS
Impression: No evidence of tumor recurrence in the arm or chest.  Despite negative imaging the patient feels there is still a mass in her buttock.    Plan: Patient needs to be scheduled to see me face-to-face in March 23 in [facility].  We will perform a physical exam at that time.

## 2024-02-27 NOTE — NURSING NOTE
Is the patient currently in the state of [place]? YES    Visit mode:VIDEO    If the visit is dropped, the patient can be reconnected by: VIDEO VISIT: Send to e-mail at: [email or web address]    Will anyone else be joining the visit? NO  (If patient encounters technical issues they should call [phone])    How would you like to obtain your AVS? MyChart    Are changes needed to the allergy or medication list? No    Reason for visit: RECHECK    No other vitals to report per pt    Opal GUTIERREZF

## 2024-02-28 ENCOUNTER — TELEPHONE (OUTPATIENT)
Dept: ORTHOPEDICS | Facility: CLINIC | Age: 54
End: 2024-02-28
Payer: COMMERCIAL

## 2024-02-28 NOTE — TELEPHONE ENCOUNTER
Patient Contacted for the patient to call back and schedule the following:    Appointment type: [facility] Return  Provider: Dr. Rae  Return date: 3/28

## 2024-04-16 ENCOUNTER — MYC MEDICAL ADVICE (OUTPATIENT)
Dept: ORTHOPEDICS | Facility: CLINIC | Age: 54
End: 2024-04-16
Payer: COMMERCIAL

## 2024-04-16 NOTE — TELEPHONE ENCOUNTER
VM left requesting a call back to reschedule missed appointment with Dr. Rae. Requested a call to my direct number if she is experiencing any barriers to care.     Marietta Renae LPN

## 2024-04-23 ENCOUNTER — TELEPHONE (OUTPATIENT)
Dept: ORTHOPEDICS | Facility: CLINIC | Age: 54
End: 2024-04-23
Payer: COMMERCIAL

## 2024-04-23 NOTE — TELEPHONE ENCOUNTER
"Writer spoke with patient about her cancelled appointment at the end of March. Patient states that Dr. Rae said there was nothing concerning in the scans. Writer read from Dr. Avery's plan on 2/27 visit where he states that she should be coming in for an in person exam. Patient states \"Yes, but it is my choice.\" Writer encouraged her to make the appointment. She declined.   "

## 2024-04-24 NOTE — CONFIDENTIAL NOTE
Attempted to call patient. LVM informing her I sent a [employer] message to her last week but she has not read it. She does not need to schedule further follow up with Dr. Rae. Relayed recommended follow up/imaging for her sarcoma that she has chosen to do with her medical oncologist, Dr. Thomson, at [facility]. Encouraged her to callback and ask for Dr. Rae's nurse if she has any questions. Clinic number provided.    Tara Holter, ARTUROCC    
None

## 2024-05-26 ENCOUNTER — HEALTH MAINTENANCE LETTER (OUTPATIENT)
Age: 54
End: 2024-05-26

## 2024-10-13 ENCOUNTER — HEALTH MAINTENANCE LETTER (OUTPATIENT)
Age: 54
End: 2024-10-13

## 2025-01-25 ENCOUNTER — HEALTH MAINTENANCE LETTER (OUTPATIENT)
Age: 55
End: 2025-01-25

## 2025-05-03 ENCOUNTER — HEALTH MAINTENANCE LETTER (OUTPATIENT)
Age: 55
End: 2025-05-03

## 2025-08-16 ENCOUNTER — HEALTH MAINTENANCE LETTER (OUTPATIENT)
Age: 55
End: 2025-08-16

## (undated) DEVICE — DRSG AQUACEL AG 3.5X6.0" HYDROFIBER 412010

## (undated) DEVICE — LINEN ORTHO PACK 5446

## (undated) DEVICE — GLOVE PROTEXIS W/NEU-THERA 7.5  2D73TE75

## (undated) DEVICE — ESU GROUND PAD ADULT W/CORD E7507

## (undated) DEVICE — ESU PENCIL W/HOLSTER

## (undated) DEVICE — SU VICRYL 2-0 CT-1 27" UND J259H

## (undated) DEVICE — GLOVE PROTEXIS BLUE W/NEU-THERA 7.5  2D73EB75

## (undated) DEVICE — SUCTION MANIFOLD NEPTUNE 2 SYS 1 PORT 702-025-000

## (undated) DEVICE — PREP CHLORAPREP 26ML TINTED ORANGE  260815

## (undated) DEVICE — SOL NACL 0.9% IRRIG 500ML BOTTLE 2F7123

## (undated) DEVICE — GLOVE PROTEXIS POWDER FREE SMT 7.0  2D72PT70X

## (undated) DEVICE — SU VICRYL 2-0 CT-2 27" UND J269H

## (undated) DEVICE — GLOVE PROTEXIS BLUE W/NEU-THERA 8.0  2D73EB80

## (undated) DEVICE — DRAPE STERI U 1015

## (undated) DEVICE — SU PDS II 3-0 PS-2 18" Z497G

## (undated) RX ORDER — FENTANYL CITRATE 50 UG/ML
INJECTION, SOLUTION INTRAMUSCULAR; INTRAVENOUS
Status: DISPENSED
Start: 2021-09-10

## (undated) RX ORDER — CEFAZOLIN SODIUM 1 G/3ML
INJECTION, POWDER, FOR SOLUTION INTRAMUSCULAR; INTRAVENOUS
Status: DISPENSED
Start: 2021-09-10

## (undated) RX ORDER — OXYCODONE HYDROCHLORIDE 5 MG/1
TABLET ORAL
Status: DISPENSED
Start: 2021-09-10